# Patient Record
Sex: MALE | Race: WHITE | NOT HISPANIC OR LATINO | Employment: OTHER | URBAN - NONMETROPOLITAN AREA
[De-identification: names, ages, dates, MRNs, and addresses within clinical notes are randomized per-mention and may not be internally consistent; named-entity substitution may affect disease eponyms.]

---

## 2018-07-03 ENCOUNTER — APPOINTMENT (OUTPATIENT)
Dept: CT IMAGING | Facility: HOSPITAL | Age: 58
End: 2018-07-03

## 2018-07-03 ENCOUNTER — HOSPITAL ENCOUNTER (EMERGENCY)
Facility: HOSPITAL | Age: 58
Discharge: ANOTHER HOSPITAL | End: 2018-07-03
Attending: EMERGENCY MEDICINE | Admitting: EMERGENCY MEDICINE

## 2018-07-03 VITALS
HEIGHT: 74 IN | BODY MASS INDEX: 19.25 KG/M2 | SYSTOLIC BLOOD PRESSURE: 104 MMHG | TEMPERATURE: 97.6 F | OXYGEN SATURATION: 97 % | RESPIRATION RATE: 20 BRPM | HEART RATE: 69 BPM | WEIGHT: 150 LBS | DIASTOLIC BLOOD PRESSURE: 57 MMHG

## 2018-07-03 DIAGNOSIS — E87.1 HYPONATREMIA: ICD-10-CM

## 2018-07-03 DIAGNOSIS — R79.89 ELEVATED SERUM CREATININE: ICD-10-CM

## 2018-07-03 DIAGNOSIS — K43.6 INCARCERATED VENTRAL HERNIA: Primary | ICD-10-CM

## 2018-07-03 DIAGNOSIS — K42.0 INCARCERATED UMBILICAL HERNIA: ICD-10-CM

## 2018-07-03 LAB
ALBUMIN SERPL-MCNC: 3.8 G/DL (ref 3.5–5)
ALBUMIN/GLOB SERPL: 1.3 G/DL (ref 1.5–2.5)
ALP SERPL-CCNC: 44 U/L (ref 40–129)
ALT SERPL W P-5'-P-CCNC: 24 U/L (ref 10–44)
ANION GAP SERPL CALCULATED.3IONS-SCNC: 9.2 MMOL/L (ref 3.6–11.2)
AST SERPL-CCNC: 23 U/L (ref 10–34)
BASOPHILS # BLD AUTO: 0.03 10*3/MM3 (ref 0–0.3)
BASOPHILS NFR BLD AUTO: 0.4 % (ref 0–2)
BILIRUB SERPL-MCNC: 0.4 MG/DL (ref 0.2–1.8)
BILIRUB UR QL STRIP: NEGATIVE
BUN BLD-MCNC: 25 MG/DL (ref 7–21)
BUN/CREAT SERPL: 13.4 (ref 7–25)
CALCIUM SPEC-SCNC: 8.9 MG/DL (ref 7.7–10)
CHLORIDE SERPL-SCNC: 93 MMOL/L (ref 99–112)
CLARITY UR: CLEAR
CO2 SERPL-SCNC: 24.8 MMOL/L (ref 24.3–31.9)
COLOR UR: YELLOW
CREAT BLD-MCNC: 1.87 MG/DL (ref 0.43–1.29)
DEPRECATED RDW RBC AUTO: 40.6 FL (ref 37–54)
EOSINOPHIL # BLD AUTO: 0.27 10*3/MM3 (ref 0–0.7)
EOSINOPHIL NFR BLD AUTO: 3.6 % (ref 0–5)
ERYTHROCYTE [DISTWIDTH] IN BLOOD BY AUTOMATED COUNT: 13.4 % (ref 11.5–14.5)
GFR SERPL CREATININE-BSD FRML MDRD: 37 ML/MIN/1.73
GLOBULIN UR ELPH-MCNC: 3 GM/DL
GLUCOSE BLD-MCNC: 231 MG/DL (ref 70–110)
GLUCOSE UR STRIP-MCNC: ABNORMAL MG/DL
HCT VFR BLD AUTO: 36.6 % (ref 42–52)
HGB BLD-MCNC: 12.8 G/DL (ref 14–18)
HGB UR QL STRIP.AUTO: NEGATIVE
IMM GRANULOCYTES # BLD: 0.02 10*3/MM3 (ref 0–0.03)
IMM GRANULOCYTES NFR BLD: 0.3 % (ref 0–0.5)
KETONES UR QL STRIP: NEGATIVE
LEUKOCYTE ESTERASE UR QL STRIP.AUTO: NEGATIVE
LIPASE SERPL-CCNC: 40 U/L (ref 13–60)
LYMPHOCYTES # BLD AUTO: 1.94 10*3/MM3 (ref 1–3)
LYMPHOCYTES NFR BLD AUTO: 25.6 % (ref 21–51)
MCH RBC QN AUTO: 29.2 PG (ref 27–33)
MCHC RBC AUTO-ENTMCNC: 35 G/DL (ref 33–37)
MCV RBC AUTO: 83.6 FL (ref 80–94)
MONOCYTES # BLD AUTO: 0.83 10*3/MM3 (ref 0.1–0.9)
MONOCYTES NFR BLD AUTO: 11 % (ref 0–10)
NEUTROPHILS # BLD AUTO: 4.48 10*3/MM3 (ref 1.4–6.5)
NEUTROPHILS NFR BLD AUTO: 59.1 % (ref 30–70)
NITRITE UR QL STRIP: NEGATIVE
OSMOLALITY SERPL CALC.SUM OF ELEC: 267 MOSM/KG (ref 273–305)
PH UR STRIP.AUTO: <=5 [PH] (ref 5–8)
PLATELET # BLD AUTO: 276 10*3/MM3 (ref 130–400)
PMV BLD AUTO: 9.7 FL (ref 6–10)
POTASSIUM BLD-SCNC: 3.8 MMOL/L (ref 3.5–5.3)
PROT SERPL-MCNC: 6.8 G/DL (ref 6–8)
PROT UR QL STRIP: NEGATIVE
RBC # BLD AUTO: 4.38 10*6/MM3 (ref 4.7–6.1)
SODIUM BLD-SCNC: 127 MMOL/L (ref 135–153)
SP GR UR STRIP: 1.02 (ref 1–1.03)
UROBILINOGEN UR QL STRIP: ABNORMAL
WBC NRBC COR # BLD: 7.57 10*3/MM3 (ref 4.5–12.5)

## 2018-07-03 PROCEDURE — 85025 COMPLETE CBC W/AUTO DIFF WBC: CPT | Performed by: PHYSICIAN ASSISTANT

## 2018-07-03 PROCEDURE — 25010000002 ONDANSETRON PER 1 MG: Performed by: EMERGENCY MEDICINE

## 2018-07-03 PROCEDURE — 81003 URINALYSIS AUTO W/O SCOPE: CPT | Performed by: PHYSICIAN ASSISTANT

## 2018-07-03 PROCEDURE — 83690 ASSAY OF LIPASE: CPT | Performed by: PHYSICIAN ASSISTANT

## 2018-07-03 PROCEDURE — 99284 EMERGENCY DEPT VISIT MOD MDM: CPT

## 2018-07-03 PROCEDURE — 96375 TX/PRO/DX INJ NEW DRUG ADDON: CPT

## 2018-07-03 PROCEDURE — 74176 CT ABD & PELVIS W/O CONTRAST: CPT | Performed by: RADIOLOGY

## 2018-07-03 PROCEDURE — 96374 THER/PROPH/DIAG INJ IV PUSH: CPT

## 2018-07-03 PROCEDURE — 80053 COMPREHEN METABOLIC PANEL: CPT | Performed by: PHYSICIAN ASSISTANT

## 2018-07-03 PROCEDURE — 74176 CT ABD & PELVIS W/O CONTRAST: CPT

## 2018-07-03 PROCEDURE — 25010000002 MORPHINE PER 10 MG: Performed by: EMERGENCY MEDICINE

## 2018-07-03 PROCEDURE — 36415 COLL VENOUS BLD VENIPUNCTURE: CPT

## 2018-07-03 RX ORDER — SODIUM CHLORIDE 0.9 % (FLUSH) 0.9 %
10 SYRINGE (ML) INJECTION AS NEEDED
Status: DISCONTINUED | OUTPATIENT
Start: 2018-07-03 | End: 2018-07-03 | Stop reason: HOSPADM

## 2018-07-03 RX ORDER — ONDANSETRON 2 MG/ML
4 INJECTION INTRAMUSCULAR; INTRAVENOUS ONCE
Status: COMPLETED | OUTPATIENT
Start: 2018-07-03 | End: 2018-07-03

## 2018-07-03 RX ADMIN — ONDANSETRON 4 MG: 2 INJECTION, SOLUTION INTRAMUSCULAR; INTRAVENOUS at 16:22

## 2018-07-03 RX ADMIN — MORPHINE SULFATE 4 MG: 4 INJECTION INTRAVENOUS at 16:24

## 2018-07-03 NOTE — ED PROVIDER NOTES
Subjective   Pt states he has history of hernia, states over the past couple days has had increased pain with standing and walking, thinks his hernia has gotten bigger.         History provided by:  Patient   used: No    Abdominal Pain   Pain location:  Periumbilical  Pain quality: cramping and throbbing    Pain radiates to:  Does not radiate  Pain severity:  Moderate  Onset quality:  Gradual  Duration:  2 days  Timing:  Constant  Progression:  Worsening  Chronicity:  New  Context: not alcohol use, not awakening from sleep, not diet changes, not eating, not laxative use, not medication withdrawal, not previous surgeries, not recent illness, not recent sexual activity, not recent travel, not retching, not sick contacts, not suspicious food intake and not trauma    Relieved by:  Nothing  Worsened by:  Nothing  Ineffective treatments:  None tried  Associated symptoms: nausea    Associated symptoms: no anorexia, no belching, no chest pain, no chills, no constipation, no cough, no diarrhea, no dysuria, no fatigue, no fever, no flatus, no hematemesis, no hematochezia, no hematuria, no melena, no shortness of breath, no sore throat, no vaginal bleeding, no vaginal discharge and no vomiting    Risk factors: obesity    Risk factors: no alcohol abuse, no aspirin use, not elderly, has not had multiple surgeries, no NSAID use, not pregnant and no recent hospitalization        Review of Systems   Constitutional: Negative for chills, fatigue and fever.   HENT: Negative for sore throat.    Respiratory: Negative for cough and shortness of breath.    Cardiovascular: Negative for chest pain.   Gastrointestinal: Positive for abdominal pain and nausea. Negative for anorexia, constipation, diarrhea, flatus, hematemesis, hematochezia, melena and vomiting.   Genitourinary: Negative for dysuria, hematuria, vaginal bleeding and vaginal discharge.   All other systems reviewed and are negative.      History reviewed. No  pertinent past medical history.    No Known Allergies    History reviewed. No pertinent surgical history.    No family history on file.    Social History     Social History   • Marital status: Single     Social History Main Topics   • Drug use: Unknown     Other Topics Concern   • Not on file           Objective   Physical Exam   Constitutional: He is oriented to person, place, and time. Vital signs are normal. He appears well-developed and well-nourished.   HENT:   Head: Normocephalic and atraumatic.   Right Ear: External ear normal.   Left Ear: External ear normal.   Nose: Nose normal.   Mouth/Throat: Oropharynx is clear and moist.   Eyes: Conjunctivae and EOM are normal. Pupils are equal, round, and reactive to light.   Neck: Normal range of motion. Neck supple.   Cardiovascular: Normal rate, regular rhythm and normal heart sounds.    Pulmonary/Chest: Effort normal and breath sounds normal.   Abdominal: Soft. Normal appearance. Bowel sounds are increased. There is tenderness in the periumbilical area.   Musculoskeletal: Normal range of motion.   Neurological: He is alert and oriented to person, place, and time. GCS eye subscore is 4. GCS verbal subscore is 5. GCS motor subscore is 6.   Skin: Skin is warm and dry. Capillary refill takes less than 2 seconds.   Nursing note and vitals reviewed.      Procedures           ED Course  ED Course as of Jul 03 1824   Tue Jul 03, 2018   1600 Called and Left message with Dr Sanchez.   [MC]   1631 Dr Sanchez will eval pt in ER,   [MC]   1717 Dr Sanchez at beside to eval pt.   [MC]   1727 Dr Sanchez wanted to admit pt tonight and do operation tomorrow, family request pt be transferred to [facility].   [MC]   1729 [facility] Patient Placement Contacted.  Will page Surgery and return my call.   [MC]   1806 I called [facility] once again, pt care placement stated that ER has 7 trauma patients that have to be seen before they can call me back.   [MC]   1802 I explained to pt what we are waiting on and asked if  they would like for me to arrange transport to another facility.  Family states no they prefer to go to [facility] but do not want to sit here and wait.  Wife of the pt states they will leave here and go straight to ER at [facility].  I advised they would have to leave AMA because I have not established a transfer nor an accepting doctor.  Family states they do not care, that sitting here is waiting is just prolonging treatment.    []   1822 While preparing paperwork to let pt leave AMA, [facility] returned call, spoke with Dr Thompson who accepts pt but request I relay message that whether or not pt has surgery is based on her findings and exam.  I relayed this message to pt wife who still request to be transferred.  Pt and family request to go POV decline EMS transportation.   [MC]      ED Course User Index  [MC] TIFF Pina                  MDM  Number of Diagnoses or Management Options  Elevated serum creatinine: new and requires workup  Hyponatremia: new and requires workup  Incarcerated umbilical hernia: new and requires workup  Incarcerated ventral hernia: new and requires workup     Amount and/or Complexity of Data Reviewed  Clinical lab tests: reviewed  Tests in the radiology section of CPT®: reviewed  Discuss the patient with other providers: yes ([facility] )    Risk of Complications, Morbidity, and/or Mortality  Presenting problems: moderate  Diagnostic procedures: moderate  Management options: moderate    Patient Progress  Patient progress: stable        Final diagnoses:   Incarcerated ventral hernia   Incarcerated umbilical hernia   Elevated serum creatinine   Hyponatremia            TIFF Pina  07/03/18 7523

## 2019-06-22 ENCOUNTER — APPOINTMENT (OUTPATIENT)
Dept: CT IMAGING | Facility: HOSPITAL | Age: 59
End: 2019-06-22

## 2019-06-22 ENCOUNTER — APPOINTMENT (OUTPATIENT)
Dept: GENERAL RADIOLOGY | Facility: HOSPITAL | Age: 59
End: 2019-06-22

## 2019-06-22 ENCOUNTER — HOSPITAL ENCOUNTER (EMERGENCY)
Facility: HOSPITAL | Age: 59
Discharge: ANOTHER HOSPITAL | End: 2019-06-22
Attending: EMERGENCY MEDICINE | Admitting: EMERGENCY MEDICINE

## 2019-06-22 VITALS
RESPIRATION RATE: 17 BRPM | OXYGEN SATURATION: 99 % | HEART RATE: 85 BPM | HEIGHT: 75 IN | TEMPERATURE: 97.8 F | WEIGHT: 315 LBS | BODY MASS INDEX: 39.17 KG/M2 | DIASTOLIC BLOOD PRESSURE: 83 MMHG | SYSTOLIC BLOOD PRESSURE: 143 MMHG

## 2019-06-22 DIAGNOSIS — S42.015A: Primary | ICD-10-CM

## 2019-06-22 LAB
ALBUMIN SERPL-MCNC: 3.79 G/DL (ref 3.5–5.2)
ALBUMIN/GLOB SERPL: 1.1 G/DL
ALP SERPL-CCNC: 49 U/L (ref 39–117)
ALT SERPL W P-5'-P-CCNC: 36 U/L (ref 1–41)
ANION GAP SERPL CALCULATED.3IONS-SCNC: 13 MMOL/L
AST SERPL-CCNC: 45 U/L (ref 1–40)
BASOPHILS # BLD AUTO: 0.02 10*3/MM3 (ref 0–0.2)
BASOPHILS NFR BLD AUTO: 0.3 % (ref 0–1.5)
BILIRUB SERPL-MCNC: 0.3 MG/DL (ref 0.2–1.2)
BUN BLD-MCNC: 22 MG/DL (ref 8–23)
BUN/CREAT SERPL: 17.7 (ref 7–25)
CALCIUM SPEC-SCNC: 9.3 MG/DL (ref 8.6–10.5)
CHLORIDE SERPL-SCNC: 91 MMOL/L (ref 98–107)
CO2 SERPL-SCNC: 23 MMOL/L (ref 22–29)
CREAT BLD-MCNC: 1.24 MG/DL (ref 0.76–1.27)
DEPRECATED RDW RBC AUTO: 41 FL (ref 37–54)
EOSINOPHIL # BLD AUTO: 0.13 10*3/MM3 (ref 0–0.4)
EOSINOPHIL NFR BLD AUTO: 1.6 % (ref 0.3–6.2)
ERYTHROCYTE [DISTWIDTH] IN BLOOD BY AUTOMATED COUNT: 13.6 % (ref 12.3–15.4)
GFR SERPL CREATININE-BSD FRML MDRD: 59 ML/MIN/1.73
GLOBULIN UR ELPH-MCNC: 3.3 GM/DL
GLUCOSE BLD-MCNC: 268 MG/DL (ref 65–99)
HCT VFR BLD AUTO: 39.6 % (ref 37.5–51)
HGB BLD-MCNC: 14.2 G/DL (ref 13–17.7)
IMM GRANULOCYTES # BLD AUTO: 0.02 10*3/MM3 (ref 0–0.05)
IMM GRANULOCYTES NFR BLD AUTO: 0.3 % (ref 0–0.5)
LYMPHOCYTES # BLD AUTO: 1.77 10*3/MM3 (ref 0.7–3.1)
LYMPHOCYTES NFR BLD AUTO: 22.3 % (ref 19.6–45.3)
MCH RBC QN AUTO: 29.4 PG (ref 26.6–33)
MCHC RBC AUTO-ENTMCNC: 35.9 G/DL (ref 31.5–35.7)
MCV RBC AUTO: 82 FL (ref 79–97)
MONOCYTES # BLD AUTO: 0.95 10*3/MM3 (ref 0.1–0.9)
MONOCYTES NFR BLD AUTO: 12 % (ref 5–12)
NEUTROPHILS # BLD AUTO: 5.05 10*3/MM3 (ref 1.7–7)
NEUTROPHILS NFR BLD AUTO: 63.5 % (ref 42.7–76)
PLATELET # BLD AUTO: 292 10*3/MM3 (ref 140–450)
PMV BLD AUTO: 10.2 FL (ref 6–12)
POTASSIUM BLD-SCNC: 4.1 MMOL/L (ref 3.5–5.2)
PROT SERPL-MCNC: 7.1 G/DL (ref 6–8.5)
RBC # BLD AUTO: 4.83 10*6/MM3 (ref 4.14–5.8)
SODIUM BLD-SCNC: 127 MMOL/L (ref 136–145)
TROPONIN T SERPL-MCNC: <0.01 NG/ML (ref 0–0.03)
WBC NRBC COR # BLD: 7.94 10*3/MM3 (ref 3.4–10.8)

## 2019-06-22 PROCEDURE — 71250 CT THORAX DX C-: CPT

## 2019-06-22 PROCEDURE — 99284 EMERGENCY DEPT VISIT MOD MDM: CPT

## 2019-06-22 PROCEDURE — 96374 THER/PROPH/DIAG INJ IV PUSH: CPT

## 2019-06-22 PROCEDURE — 70450 CT HEAD/BRAIN W/O DYE: CPT

## 2019-06-22 PROCEDURE — 25010000002 MORPHINE PER 10 MG: Performed by: EMERGENCY MEDICINE

## 2019-06-22 PROCEDURE — 70450 CT HEAD/BRAIN W/O DYE: CPT | Performed by: RADIOLOGY

## 2019-06-22 PROCEDURE — 73030 X-RAY EXAM OF SHOULDER: CPT | Performed by: RADIOLOGY

## 2019-06-22 PROCEDURE — 25010000002 ONDANSETRON PER 1 MG: Performed by: EMERGENCY MEDICINE

## 2019-06-22 PROCEDURE — 71270 CT THORAX DX C-/C+: CPT

## 2019-06-22 PROCEDURE — 71045 X-RAY EXAM CHEST 1 VIEW: CPT

## 2019-06-22 PROCEDURE — 71045 X-RAY EXAM CHEST 1 VIEW: CPT | Performed by: RADIOLOGY

## 2019-06-22 PROCEDURE — 0 IOVERSOL 68 % SOLUTION: Performed by: EMERGENCY MEDICINE

## 2019-06-22 PROCEDURE — 93005 ELECTROCARDIOGRAM TRACING: CPT | Performed by: EMERGENCY MEDICINE

## 2019-06-22 PROCEDURE — 71260 CT THORAX DX C+: CPT

## 2019-06-22 PROCEDURE — 96375 TX/PRO/DX INJ NEW DRUG ADDON: CPT

## 2019-06-22 PROCEDURE — 73030 X-RAY EXAM OF SHOULDER: CPT

## 2019-06-22 PROCEDURE — 71250 CT THORAX DX C-: CPT | Performed by: RADIOLOGY

## 2019-06-22 PROCEDURE — 80053 COMPREHEN METABOLIC PANEL: CPT | Performed by: EMERGENCY MEDICINE

## 2019-06-22 PROCEDURE — 85025 COMPLETE CBC W/AUTO DIFF WBC: CPT | Performed by: EMERGENCY MEDICINE

## 2019-06-22 PROCEDURE — 84484 ASSAY OF TROPONIN QUANT: CPT | Performed by: EMERGENCY MEDICINE

## 2019-06-22 PROCEDURE — 71260 CT THORAX DX C+: CPT | Performed by: RADIOLOGY

## 2019-06-22 RX ORDER — ONDANSETRON 2 MG/ML
4 INJECTION INTRAMUSCULAR; INTRAVENOUS ONCE
Status: COMPLETED | OUTPATIENT
Start: 2019-06-22 | End: 2019-06-22

## 2019-06-22 RX ORDER — SODIUM CHLORIDE 0.9 % (FLUSH) 0.9 %
10 SYRINGE (ML) INJECTION AS NEEDED
Status: DISCONTINUED | OUTPATIENT
Start: 2019-06-22 | End: 2019-06-22 | Stop reason: HOSPADM

## 2019-06-22 RX ADMIN — ONDANSETRON 4 MG: 2 INJECTION, SOLUTION INTRAMUSCULAR; INTRAVENOUS at 12:21

## 2019-06-22 RX ADMIN — MORPHINE SULFATE 4 MG: 4 INJECTION, SOLUTION INTRAMUSCULAR; INTRAVENOUS at 12:21

## 2019-06-22 RX ADMIN — IOVERSOL 100 ML: 678 INJECTION INTRA-ARTERIAL; INTRAVENOUS at 17:57

## 2019-06-22 RX ADMIN — SODIUM CHLORIDE 1000 ML: 9 INJECTION, SOLUTION INTRAVENOUS at 16:37

## 2019-06-26 ENCOUNTER — HOSPITAL ENCOUNTER (EMERGENCY)
Facility: HOSPITAL | Age: 59
Discharge: HOME | End: 2019-06-26
Attending: EMERGENCY MEDICINE | Admitting: EMERGENCY MEDICINE

## 2019-06-26 ENCOUNTER — APPOINTMENT (OUTPATIENT)
Dept: GENERAL RADIOLOGY | Facility: HOSPITAL | Age: 59
End: 2019-06-26

## 2019-06-26 VITALS
BODY MASS INDEX: 39.17 KG/M2 | RESPIRATION RATE: 18 BRPM | TEMPERATURE: 98.1 F | DIASTOLIC BLOOD PRESSURE: 85 MMHG | WEIGHT: 315 LBS | SYSTOLIC BLOOD PRESSURE: 162 MMHG | HEIGHT: 75 IN | HEART RATE: 84 BPM | OXYGEN SATURATION: 97 %

## 2019-06-26 DIAGNOSIS — S49.92XD INJURY OF LEFT CLAVICLE, SUBSEQUENT ENCOUNTER: Primary | ICD-10-CM

## 2019-06-26 PROCEDURE — 73000 X-RAY EXAM OF COLLAR BONE: CPT

## 2019-06-26 PROCEDURE — 99284 EMERGENCY DEPT VISIT MOD MDM: CPT

## 2019-06-26 RX ORDER — AMITRIPTYLINE HYDROCHLORIDE 100 MG/1
100 TABLET, FILM COATED ORAL NIGHTLY
COMMUNITY

## 2019-06-26 RX ORDER — LISINOPRIL 40 MG/1
10 TABLET ORAL EVERY EVENING
COMMUNITY

## 2019-06-26 RX ORDER — ASPIRIN 81 MG/1
81 TABLET ORAL EVERY EVENING
COMMUNITY

## 2019-06-26 RX ORDER — TRAMADOL HYDROCHLORIDE 50 MG/1
50 TABLET ORAL EVERY 6 HOURS PRN
COMMUNITY

## 2019-06-26 RX ORDER — OXYCODONE AND ACETAMINOPHEN 10; 325 MG/1; MG/1
1 TABLET ORAL EVERY 6 HOURS PRN
Status: ON HOLD | COMMUNITY
End: 2019-07-04 | Stop reason: SDUPTHER

## 2019-06-26 RX ORDER — FENOFIBRATE 145 MG/1
145 TABLET, COATED ORAL EVERY EVENING
COMMUNITY

## 2019-06-26 RX ORDER — LEVOTHYROXINE SODIUM 0.03 MG/1
25 TABLET ORAL EVERY EVENING
COMMUNITY

## 2019-06-26 RX ORDER — IBUPROFEN 400 MG/1
400 TABLET ORAL EVERY 6 HOURS PRN
COMMUNITY
End: 2020-03-27

## 2019-07-02 ENCOUNTER — HOSPITAL ENCOUNTER (OUTPATIENT)
Dept: GENERAL RADIOLOGY | Facility: HOSPITAL | Age: 59
Discharge: HOME | End: 2019-07-02
Admitting: ORTHOPAEDIC SURGERY

## 2019-07-02 ENCOUNTER — APPOINTMENT (OUTPATIENT)
Dept: PREADMISSION TESTING | Facility: HOSPITAL | Age: 59
End: 2019-07-02

## 2019-07-02 VITALS — BODY MASS INDEX: 39.17 KG/M2 | WEIGHT: 315 LBS | HEIGHT: 75 IN

## 2019-07-02 LAB
HBA1C MFR BLD: 8.5 % (ref 4.8–5.6)
POTASSIUM BLD-SCNC: 4.4 MMOL/L (ref 3.5–5.2)

## 2019-07-02 PROCEDURE — 86901 BLOOD TYPING SEROLOGIC RH(D): CPT

## 2019-07-02 PROCEDURE — 36415 COLL VENOUS BLD VENIPUNCTURE: CPT

## 2019-07-02 PROCEDURE — 84132 ASSAY OF SERUM POTASSIUM: CPT | Performed by: ANESTHESIOLOGY

## 2019-07-02 PROCEDURE — 71046 X-RAY EXAM CHEST 2 VIEWS: CPT

## 2019-07-02 PROCEDURE — 86900 BLOOD TYPING SEROLOGIC ABO: CPT

## 2019-07-02 PROCEDURE — 83036 HEMOGLOBIN GLYCOSYLATED A1C: CPT | Performed by: ORTHOPAEDIC SURGERY

## 2019-07-02 NOTE — PAT
CBC and BMP 6/22/19 in ER as well as EKG         Patient instructed to drink 20 ounces (or until full) of Gatorade or 20 ounces of G2 (if diabetic) and complete 1 hour before arrival time for procedure (NO RED Gatorade or G2)    Patient verbalized understanding.

## 2019-07-03 ENCOUNTER — HOSPITAL ENCOUNTER (OUTPATIENT)
Facility: HOSPITAL | Age: 59
Discharge: HOME | End: 2019-07-04
Attending: ORTHOPAEDIC SURGERY | Admitting: ORTHOPAEDIC SURGERY

## 2019-07-03 ENCOUNTER — APPOINTMENT (OUTPATIENT)
Dept: GENERAL RADIOLOGY | Facility: HOSPITAL | Age: 59
End: 2019-07-03

## 2019-07-03 ENCOUNTER — ANESTHESIA (OUTPATIENT)
Dept: PERIOP | Facility: HOSPITAL | Age: 59
End: 2019-07-03

## 2019-07-03 ENCOUNTER — ANESTHESIA EVENT (OUTPATIENT)
Dept: PERIOP | Facility: HOSPITAL | Age: 59
End: 2019-07-03

## 2019-07-03 DIAGNOSIS — E11.9 TYPE 2 DIABETES MELLITUS WITHOUT COMPLICATION, WITHOUT LONG-TERM CURRENT USE OF INSULIN (HCC): Primary | ICD-10-CM

## 2019-07-03 DIAGNOSIS — Z74.09 IMPAIRED MOBILITY AND ADLS: ICD-10-CM

## 2019-07-03 DIAGNOSIS — Z78.9 IMPAIRED MOBILITY AND ADLS: ICD-10-CM

## 2019-07-03 PROBLEM — I10 ESSENTIAL HYPERTENSION: Status: ACTIVE | Noted: 2019-07-03

## 2019-07-03 PROBLEM — Z99.89 OSA ON CPAP: Status: ACTIVE | Noted: 2019-07-03

## 2019-07-03 PROBLEM — G47.33 OSA ON CPAP: Status: ACTIVE | Noted: 2019-07-03

## 2019-07-03 PROBLEM — E03.9 HYPOTHYROIDISM (ACQUIRED): Status: ACTIVE | Noted: 2019-07-03

## 2019-07-03 PROBLEM — S43.225A: Status: ACTIVE | Noted: 2019-07-03

## 2019-07-03 PROBLEM — E78.5 HLD (HYPERLIPIDEMIA): Status: ACTIVE | Noted: 2019-07-03

## 2019-07-03 LAB
ABO GROUP BLD: NORMAL
ABO GROUP BLD: NORMAL
BLD GP AB SCN SERPL QL: NEGATIVE
GLUCOSE BLDC GLUCOMTR-MCNC: 133 MG/DL (ref 70–130)
GLUCOSE BLDC GLUCOMTR-MCNC: 284 MG/DL (ref 70–130)
GLUCOSE BLDC GLUCOMTR-MCNC: 295 MG/DL (ref 70–130)
RH BLD: POSITIVE
RH BLD: POSITIVE
SODIUM BLD-SCNC: 130 MMOL/L (ref 136–145)
T&S EXPIRATION DATE: NORMAL

## 2019-07-03 PROCEDURE — 25010000002 DEXAMETHASONE SODIUM PHOSPHATE 10 MG/ML SOLUTION: Performed by: NURSE ANESTHETIST, CERTIFIED REGISTERED

## 2019-07-03 PROCEDURE — C1713 ANCHOR/SCREW BN/BN,TIS/BN: HCPCS | Performed by: ORTHOPAEDIC SURGERY

## 2019-07-03 PROCEDURE — 25010000002 NEOSTIGMINE 10 MG/10ML SOLUTION: Performed by: NURSE ANESTHETIST, CERTIFIED REGISTERED

## 2019-07-03 PROCEDURE — 25010000002 FENTANYL CITRATE (PF) 100 MCG/2ML SOLUTION: Performed by: NURSE ANESTHETIST, CERTIFIED REGISTERED

## 2019-07-03 PROCEDURE — 25010000002 DEXAMETHASONE PER 1 MG: Performed by: NURSE ANESTHETIST, CERTIFIED REGISTERED

## 2019-07-03 PROCEDURE — 63710000001 INSULIN LISPRO (HUMAN) PER 5 UNITS: Performed by: NURSE PRACTITIONER

## 2019-07-03 PROCEDURE — 71045 X-RAY EXAM CHEST 1 VIEW: CPT

## 2019-07-03 PROCEDURE — 25010000002 VANCOMYCIN: Performed by: ORTHOPAEDIC SURGERY

## 2019-07-03 PROCEDURE — 86901 BLOOD TYPING SEROLOGIC RH(D): CPT | Performed by: ANESTHESIOLOGY

## 2019-07-03 PROCEDURE — 99243 OFF/OP CNSLTJ NEW/EST LOW 30: CPT | Performed by: NURSE PRACTITIONER

## 2019-07-03 PROCEDURE — 25010000002 CEFAZOLIN PER 500 MG: Performed by: ORTHOPAEDIC SURGERY

## 2019-07-03 PROCEDURE — 86850 RBC ANTIBODY SCREEN: CPT | Performed by: ANESTHESIOLOGY

## 2019-07-03 PROCEDURE — 25010000002 ONDANSETRON PER 1 MG: Performed by: NURSE ANESTHETIST, CERTIFIED REGISTERED

## 2019-07-03 PROCEDURE — 82962 GLUCOSE BLOOD TEST: CPT

## 2019-07-03 PROCEDURE — L3670 SO ACRO/CLAV CAN WEB PRE OTS: HCPCS | Performed by: ORTHOPAEDIC SURGERY

## 2019-07-03 PROCEDURE — 99245 OFF/OP CONSLTJ NEW/EST HI 55: CPT | Performed by: THORACIC SURGERY (CARDIOTHORACIC VASCULAR SURGERY)

## 2019-07-03 PROCEDURE — 86920 COMPATIBILITY TEST SPIN: CPT

## 2019-07-03 PROCEDURE — 23530 OPTX STRNCLAV DISLC AQT/CHRN: CPT | Performed by: THORACIC SURGERY (CARDIOTHORACIC VASCULAR SURGERY)

## 2019-07-03 PROCEDURE — 25010000002 HYDROMORPHONE 1 MG/ML SOLUTION: Performed by: ORTHOPAEDIC SURGERY

## 2019-07-03 PROCEDURE — 25010000002 PROPOFOL 10 MG/ML EMULSION: Performed by: NURSE ANESTHETIST, CERTIFIED REGISTERED

## 2019-07-03 PROCEDURE — 25010000002 BUPRENORPHINE PER 0.1 MG: Performed by: NURSE ANESTHETIST, CERTIFIED REGISTERED

## 2019-07-03 PROCEDURE — 84295 ASSAY OF SERUM SODIUM: CPT | Performed by: ANESTHESIOLOGY

## 2019-07-03 PROCEDURE — 86900 BLOOD TYPING SEROLOGIC ABO: CPT | Performed by: ANESTHESIOLOGY

## 2019-07-03 DEVICE — SUT/ANCH GRYPHON/P BR W/DYNACORD: Type: IMPLANTABLE DEVICE | Site: ACROMIAL PROCESS | Status: FUNCTIONAL

## 2019-07-03 DEVICE — CLIP LIG HEMOLOK PA LG 6CT PRP: Type: IMPLANTABLE DEVICE | Status: FUNCTIONAL

## 2019-07-03 RX ORDER — LISINOPRIL 40 MG/1
40 TABLET ORAL EVERY EVENING
Status: DISCONTINUED | OUTPATIENT
Start: 2019-07-04 | End: 2019-07-03

## 2019-07-03 RX ORDER — PROMETHAZINE HYDROCHLORIDE 25 MG/1
25 TABLET ORAL ONCE AS NEEDED
Status: DISCONTINUED | OUTPATIENT
Start: 2019-07-03 | End: 2019-07-03 | Stop reason: HOSPADM

## 2019-07-03 RX ORDER — ONDANSETRON 2 MG/ML
4 INJECTION INTRAMUSCULAR; INTRAVENOUS EVERY 6 HOURS PRN
Status: DISCONTINUED | OUTPATIENT
Start: 2019-07-03 | End: 2019-07-04 | Stop reason: HOSPADM

## 2019-07-03 RX ORDER — BISACODYL 5 MG/1
10 TABLET, DELAYED RELEASE ORAL DAILY PRN
Status: DISCONTINUED | OUTPATIENT
Start: 2019-07-03 | End: 2019-07-04 | Stop reason: HOSPADM

## 2019-07-03 RX ORDER — AMITRIPTYLINE HYDROCHLORIDE 50 MG/1
100 TABLET, FILM COATED ORAL NIGHTLY
Status: DISCONTINUED | OUTPATIENT
Start: 2019-07-04 | End: 2019-07-03

## 2019-07-03 RX ORDER — HYDROCODONE BITARTRATE AND ACETAMINOPHEN 10; 325 MG/1; MG/1
1 TABLET ORAL EVERY 4 HOURS PRN
Status: DISCONTINUED | OUTPATIENT
Start: 2019-07-03 | End: 2019-07-04 | Stop reason: HOSPADM

## 2019-07-03 RX ORDER — PROMETHAZINE HYDROCHLORIDE 25 MG/ML
6.25 INJECTION, SOLUTION INTRAMUSCULAR; INTRAVENOUS ONCE AS NEEDED
Status: DISCONTINUED | OUTPATIENT
Start: 2019-07-03 | End: 2019-07-03 | Stop reason: HOSPADM

## 2019-07-03 RX ORDER — MAGNESIUM HYDROXIDE 1200 MG/15ML
LIQUID ORAL AS NEEDED
Status: DISCONTINUED | OUTPATIENT
Start: 2019-07-03 | End: 2019-07-03 | Stop reason: HOSPADM

## 2019-07-03 RX ORDER — FAMOTIDINE 20 MG/1
20 TABLET, FILM COATED ORAL
Status: DISCONTINUED | OUTPATIENT
Start: 2019-07-03 | End: 2019-07-03 | Stop reason: HOSPADM

## 2019-07-03 RX ORDER — DEXTROSE MONOHYDRATE 25 G/50ML
25 INJECTION, SOLUTION INTRAVENOUS
Status: DISCONTINUED | OUTPATIENT
Start: 2019-07-03 | End: 2019-07-04 | Stop reason: HOSPADM

## 2019-07-03 RX ORDER — SODIUM CHLORIDE, SODIUM LACTATE, POTASSIUM CHLORIDE, CALCIUM CHLORIDE 600; 310; 30; 20 MG/100ML; MG/100ML; MG/100ML; MG/100ML
9 INJECTION, SOLUTION INTRAVENOUS CONTINUOUS PRN
Status: DISCONTINUED | OUTPATIENT
Start: 2019-07-03 | End: 2019-07-03 | Stop reason: HOSPADM

## 2019-07-03 RX ORDER — BUPRENORPHINE HYDROCHLORIDE 0.32 MG/ML
INJECTION INTRAMUSCULAR; INTRAVENOUS
Status: COMPLETED | OUTPATIENT
Start: 2019-07-03 | End: 2019-07-03

## 2019-07-03 RX ORDER — LIDOCAINE HYDROCHLORIDE 10 MG/ML
INJECTION, SOLUTION EPIDURAL; INFILTRATION; INTRACAUDAL; PERINEURAL AS NEEDED
Status: DISCONTINUED | OUTPATIENT
Start: 2019-07-03 | End: 2019-07-03 | Stop reason: SURG

## 2019-07-03 RX ORDER — DEXAMETHASONE SODIUM PHOSPHATE 10 MG/ML
INJECTION, SOLUTION INTRAMUSCULAR; INTRAVENOUS
Status: COMPLETED | OUTPATIENT
Start: 2019-07-03 | End: 2019-07-03

## 2019-07-03 RX ORDER — NALOXONE HCL 0.4 MG/ML
0.1 VIAL (ML) INJECTION
Status: DISCONTINUED | OUTPATIENT
Start: 2019-07-03 | End: 2019-07-04 | Stop reason: HOSPADM

## 2019-07-03 RX ORDER — NEOSTIGMINE METHYLSULFATE 1 MG/ML
INJECTION, SOLUTION INTRAVENOUS AS NEEDED
Status: DISCONTINUED | OUTPATIENT
Start: 2019-07-03 | End: 2019-07-03 | Stop reason: SURG

## 2019-07-03 RX ORDER — FENTANYL CITRATE 50 UG/ML
50 INJECTION, SOLUTION INTRAMUSCULAR; INTRAVENOUS
Status: DISCONTINUED | OUTPATIENT
Start: 2019-07-03 | End: 2019-07-03 | Stop reason: HOSPADM

## 2019-07-03 RX ORDER — ROCURONIUM BROMIDE 10 MG/ML
INJECTION, SOLUTION INTRAVENOUS AS NEEDED
Status: DISCONTINUED | OUTPATIENT
Start: 2019-07-03 | End: 2019-07-03 | Stop reason: SURG

## 2019-07-03 RX ORDER — ONDANSETRON 2 MG/ML
4 INJECTION INTRAMUSCULAR; INTRAVENOUS ONCE AS NEEDED
Status: DISCONTINUED | OUTPATIENT
Start: 2019-07-03 | End: 2019-07-03 | Stop reason: HOSPADM

## 2019-07-03 RX ORDER — FENOFIBRATE 145 MG/1
145 TABLET, COATED ORAL DAILY
Status: DISCONTINUED | OUTPATIENT
Start: 2019-07-04 | End: 2019-07-03

## 2019-07-03 RX ORDER — FENTANYL CITRATE 50 UG/ML
INJECTION, SOLUTION INTRAMUSCULAR; INTRAVENOUS AS NEEDED
Status: DISCONTINUED | OUTPATIENT
Start: 2019-07-03 | End: 2019-07-03 | Stop reason: SURG

## 2019-07-03 RX ORDER — DOCUSATE SODIUM 100 MG/1
100 CAPSULE, LIQUID FILLED ORAL 2 TIMES DAILY PRN
Status: DISCONTINUED | OUTPATIENT
Start: 2019-07-03 | End: 2019-07-04 | Stop reason: HOSPADM

## 2019-07-03 RX ORDER — LEVOTHYROXINE SODIUM 0.03 MG/1
25 TABLET ORAL EVERY EVENING
Status: DISCONTINUED | OUTPATIENT
Start: 2019-07-04 | End: 2019-07-03

## 2019-07-03 RX ORDER — FENOFIBRATE 145 MG/1
145 TABLET, COATED ORAL NIGHTLY
Status: DISCONTINUED | OUTPATIENT
Start: 2019-07-03 | End: 2019-07-04 | Stop reason: HOSPADM

## 2019-07-03 RX ORDER — PROMETHAZINE HYDROCHLORIDE 25 MG/1
25 SUPPOSITORY RECTAL ONCE AS NEEDED
Status: DISCONTINUED | OUTPATIENT
Start: 2019-07-03 | End: 2019-07-03 | Stop reason: HOSPADM

## 2019-07-03 RX ORDER — ONDANSETRON 2 MG/ML
INJECTION INTRAMUSCULAR; INTRAVENOUS AS NEEDED
Status: DISCONTINUED | OUTPATIENT
Start: 2019-07-03 | End: 2019-07-03 | Stop reason: SURG

## 2019-07-03 RX ORDER — ACETAMINOPHEN 500 MG
1000 TABLET ORAL ONCE
Status: COMPLETED | OUTPATIENT
Start: 2019-07-03 | End: 2019-07-03

## 2019-07-03 RX ORDER — LIDOCAINE HYDROCHLORIDE 10 MG/ML
0.5 INJECTION, SOLUTION EPIDURAL; INFILTRATION; INTRACAUDAL; PERINEURAL ONCE AS NEEDED
Status: COMPLETED | OUTPATIENT
Start: 2019-07-03 | End: 2019-07-03

## 2019-07-03 RX ORDER — NICOTINE POLACRILEX 4 MG
15 LOZENGE BUCCAL
Status: DISCONTINUED | OUTPATIENT
Start: 2019-07-03 | End: 2019-07-04 | Stop reason: HOSPADM

## 2019-07-03 RX ORDER — DEXAMETHASONE SODIUM PHOSPHATE 4 MG/ML
INJECTION, SOLUTION INTRA-ARTICULAR; INTRALESIONAL; INTRAMUSCULAR; INTRAVENOUS; SOFT TISSUE AS NEEDED
Status: DISCONTINUED | OUTPATIENT
Start: 2019-07-03 | End: 2019-07-03 | Stop reason: SURG

## 2019-07-03 RX ORDER — ONDANSETRON 4 MG/1
4 TABLET, FILM COATED ORAL EVERY 6 HOURS PRN
Status: DISCONTINUED | OUTPATIENT
Start: 2019-07-03 | End: 2019-07-04 | Stop reason: HOSPADM

## 2019-07-03 RX ORDER — MEPERIDINE HYDROCHLORIDE 25 MG/ML
12.5 INJECTION INTRAMUSCULAR; INTRAVENOUS; SUBCUTANEOUS
Status: DISCONTINUED | OUTPATIENT
Start: 2019-07-03 | End: 2019-07-03 | Stop reason: HOSPADM

## 2019-07-03 RX ORDER — SODIUM CHLORIDE 0.9 % (FLUSH) 0.9 %
3 SYRINGE (ML) INJECTION EVERY 12 HOURS SCHEDULED
Status: DISCONTINUED | OUTPATIENT
Start: 2019-07-03 | End: 2019-07-03 | Stop reason: HOSPADM

## 2019-07-03 RX ORDER — SODIUM CHLORIDE, SODIUM LACTATE, POTASSIUM CHLORIDE, CALCIUM CHLORIDE 600; 310; 30; 20 MG/100ML; MG/100ML; MG/100ML; MG/100ML
INJECTION, SOLUTION INTRAVENOUS CONTINUOUS PRN
Status: DISCONTINUED | OUTPATIENT
Start: 2019-07-03 | End: 2019-07-03 | Stop reason: SURG

## 2019-07-03 RX ORDER — LISINOPRIL 40 MG/1
40 TABLET ORAL EVERY EVENING
Status: DISCONTINUED | OUTPATIENT
Start: 2019-07-03 | End: 2019-07-04 | Stop reason: HOSPADM

## 2019-07-03 RX ORDER — PROPOFOL 10 MG/ML
VIAL (ML) INTRAVENOUS AS NEEDED
Status: DISCONTINUED | OUTPATIENT
Start: 2019-07-03 | End: 2019-07-03 | Stop reason: SURG

## 2019-07-03 RX ORDER — SODIUM CHLORIDE 450 MG/100ML
50 INJECTION, SOLUTION INTRAVENOUS CONTINUOUS
Status: DISCONTINUED | OUTPATIENT
Start: 2019-07-03 | End: 2019-07-04 | Stop reason: HOSPADM

## 2019-07-03 RX ORDER — GLYCOPYRROLATE 0.2 MG/ML
INJECTION INTRAMUSCULAR; INTRAVENOUS AS NEEDED
Status: DISCONTINUED | OUTPATIENT
Start: 2019-07-03 | End: 2019-07-03 | Stop reason: SURG

## 2019-07-03 RX ORDER — SODIUM CHLORIDE 0.9 % (FLUSH) 0.9 %
1-10 SYRINGE (ML) INJECTION AS NEEDED
Status: DISCONTINUED | OUTPATIENT
Start: 2019-07-03 | End: 2019-07-03 | Stop reason: HOSPADM

## 2019-07-03 RX ORDER — BUPIVACAINE HYDROCHLORIDE 2.5 MG/ML
INJECTION, SOLUTION EPIDURAL; INFILTRATION; INTRACAUDAL
Status: COMPLETED | OUTPATIENT
Start: 2019-07-03 | End: 2019-07-03

## 2019-07-03 RX ORDER — CEFAZOLIN SODIUM IN 0.9 % NACL 3 G/100 ML
3 INTRAVENOUS SOLUTION, PIGGYBACK (ML) INTRAVENOUS ONCE
Status: COMPLETED | OUTPATIENT
Start: 2019-07-03 | End: 2019-07-03

## 2019-07-03 RX ORDER — AMITRIPTYLINE HYDROCHLORIDE 50 MG/1
100 TABLET, FILM COATED ORAL NIGHTLY
Status: DISCONTINUED | OUTPATIENT
Start: 2019-07-03 | End: 2019-07-04 | Stop reason: HOSPADM

## 2019-07-03 RX ORDER — LEVOTHYROXINE SODIUM 0.03 MG/1
25 TABLET ORAL NIGHTLY
Status: DISCONTINUED | OUTPATIENT
Start: 2019-07-03 | End: 2019-07-04 | Stop reason: HOSPADM

## 2019-07-03 RX ADMIN — DEXAMETHASONE SODIUM PHOSPHATE 4 MG: 10 INJECTION INTRAMUSCULAR; INTRAVENOUS at 12:42

## 2019-07-03 RX ADMIN — BUPIVACAINE HYDROCHLORIDE 10 ML: 2.5 INJECTION, SOLUTION EPIDURAL; INFILTRATION; INTRACAUDAL; PERINEURAL at 12:42

## 2019-07-03 RX ADMIN — PROPOFOL 250 MG: 10 INJECTION, EMULSION INTRAVENOUS at 13:18

## 2019-07-03 RX ADMIN — FAMOTIDINE 20 MG: 20 TABLET ORAL at 12:02

## 2019-07-03 RX ADMIN — LISINOPRIL 40 MG: 40 TABLET ORAL at 22:23

## 2019-07-03 RX ADMIN — FAMOTIDINE 20 MG: 20 TABLET ORAL at 12:00

## 2019-07-03 RX ADMIN — LEVOTHYROXINE SODIUM 25 MCG: 25 TABLET ORAL at 22:23

## 2019-07-03 RX ADMIN — HYDROCODONE BITARTRATE AND ACETAMINOPHEN 1 TABLET: 10; 325 TABLET ORAL at 22:23

## 2019-07-03 RX ADMIN — ROCURONIUM BROMIDE 10 MG: 10 INJECTION INTRAVENOUS at 13:18

## 2019-07-03 RX ADMIN — FENTANYL CITRATE 100 MCG: 50 INJECTION, SOLUTION INTRAMUSCULAR; INTRAVENOUS at 13:18

## 2019-07-03 RX ADMIN — ONDANSETRON 4 MG: 2 INJECTION INTRAMUSCULAR; INTRAVENOUS at 14:31

## 2019-07-03 RX ADMIN — SODIUM CHLORIDE, POTASSIUM CHLORIDE, SODIUM LACTATE AND CALCIUM CHLORIDE 9 ML/HR: 600; 310; 30; 20 INJECTION, SOLUTION INTRAVENOUS at 12:02

## 2019-07-03 RX ADMIN — INSULIN LISPRO 4 UNITS: 100 INJECTION, SOLUTION INTRAVENOUS; SUBCUTANEOUS at 23:38

## 2019-07-03 RX ADMIN — EPHEDRINE SULFATE 10 MG: 50 INJECTION INTRAMUSCULAR; INTRAVENOUS; SUBCUTANEOUS at 13:26

## 2019-07-03 RX ADMIN — SODIUM CHLORIDE, POTASSIUM CHLORIDE, SODIUM LACTATE AND CALCIUM CHLORIDE: 600; 310; 30; 20 INJECTION, SOLUTION INTRAVENOUS at 13:10

## 2019-07-03 RX ADMIN — ROCURONIUM BROMIDE 40 MG: 10 INJECTION INTRAVENOUS at 13:28

## 2019-07-03 RX ADMIN — SODIUM CHLORIDE 50 ML/HR: 4.5 INJECTION, SOLUTION INTRAVENOUS at 15:54

## 2019-07-03 RX ADMIN — LIDOCAINE HYDROCHLORIDE 0.3 ML: 10 INJECTION, SOLUTION EPIDURAL; INFILTRATION; INTRACAUDAL; PERINEURAL at 12:02

## 2019-07-03 RX ADMIN — VANCOMYCIN HYDROCHLORIDE 2250 MG: 1 INJECTION, POWDER, LYOPHILIZED, FOR SOLUTION INTRAVENOUS at 12:44

## 2019-07-03 RX ADMIN — GLYCOPYRROLATE 0.4 MG: 0.2 INJECTION, SOLUTION INTRAMUSCULAR; INTRAVENOUS at 14:43

## 2019-07-03 RX ADMIN — LIDOCAINE HYDROCHLORIDE 50 MG: 10 INJECTION, SOLUTION EPIDURAL; INFILTRATION; INTRACAUDAL; PERINEURAL at 13:18

## 2019-07-03 RX ADMIN — HYDROCODONE BITARTRATE AND ACETAMINOPHEN 1 TABLET: 10; 325 TABLET ORAL at 17:54

## 2019-07-03 RX ADMIN — DEXAMETHASONE SODIUM PHOSPHATE 8 MG: 4 INJECTION, SOLUTION INTRAMUSCULAR; INTRAVENOUS at 13:30

## 2019-07-03 RX ADMIN — AMITRIPTYLINE HYDROCHLORIDE 100 MG: 50 TABLET, FILM COATED ORAL at 22:23

## 2019-07-03 RX ADMIN — ACETAMINOPHEN 1000 MG: 500 TABLET ORAL at 12:02

## 2019-07-03 RX ADMIN — SODIUM CHLORIDE 3 G: 9 INJECTION, SOLUTION INTRAVENOUS at 12:21

## 2019-07-03 RX ADMIN — NEOSTIGMINE METHYLSULFATE 3.5 MG: 1 INJECTION, SOLUTION INTRAVENOUS at 14:43

## 2019-07-03 RX ADMIN — FENOFIBRATE 145 MG: 145 TABLET ORAL at 22:23

## 2019-07-03 RX ADMIN — BUPRENORPHINE HYDROCHLORIDE 0.3 MG: 0.32 INJECTION INTRAMUSCULAR; INTRAVENOUS at 12:42

## 2019-07-03 NOTE — INTERVAL H&P NOTE
Pre-Op H&P (See Recent Office Note Attached for Full H&P)    Chief complaint: Left shoulder pain    HPI:      Patient is a 59 y.o. male who presents with left shoulder pain that started immediately after he fell off the roof of his mobile home on 6/21/19. He went to an urgent care center in [place], [place] where he was told he had a clavicle injury. He followed up at [facility] where he was told he had a posterior sternoclavicular dislocation of his left clavicle. He then went to [facility] in [place] who referred him to Dr. Carey.for definitive treatment. CT scan shows a left posterior sternoclavicular dislocation. Surgical intervention is recommended and he is agreeable. He is here today for an open reduction left sternoclavicular dislocation.      Review of Systems:  General ROS:  no fever, chills, rashes, No change since last office visit  Cardiovascular ROS: no chest pain or dyspnea on exertion; +HTN  Respiratory ROS: no cough, shortness of breath, or wheezing: +MEGA (uses CPAP); former cigarette smoker (3 ppd)- quit 1995    Meds:    No current facility-administered medications on file prior to encounter.      Current Outpatient Medications on File Prior to Encounter   Medication Sig Dispense Refill   • amitriptyline (ELAVIL) 100 MG tablet Take 100 mg by mouth Every Night.     • aspirin 81 MG EC tablet Take 81 mg by mouth Every Evening.     • fenofibrate (TRICOR) 145 MG tablet Take 145 mg by mouth Every Evening.     • ibuprofen (ADVIL,MOTRIN) 400 MG tablet Take 400 mg by mouth Every 6 (Six) Hours As Needed for Mild Pain .     • levothyroxine (SYNTHROID, LEVOTHROID) 25 MCG tablet Take 25 mcg by mouth Every Evening.     • lisinopril (PRINIVIL,ZESTRIL) 40 MG tablet Take 40 mg by mouth Every Evening.     • oxyCODONE-acetaminophen (PERCOCET)  MG per tablet Take 1 tablet by mouth Every 6 (Six) Hours As Needed for Moderate Pain .     • traMADol (ULTRAM) 50 MG tablet Take 50 mg by mouth Every 6  "(Six) Hours As Needed for Moderate Pain .         Vital Signs:  /82 (BP Location: Right arm, Patient Position: Lying)   Pulse 95   Temp 98.4 °F (36.9 °C) (Temporal)   Resp 18   Ht 190.5 cm (75\")   Wt (!) 151 kg (332 lb 6.4 oz)   SpO2 95%   BMI 41.55 kg/m²     Physical Exam:    CV:  S1S2 regular rate, irregular rhythm, no murmur               Resp:  Clear to auscultation; diminished lung sounds bilaterally; respirations  regular, even and unlabored    Results Review:     Lab Results   Component Value Date    WBC 7.94 06/22/2019    HGB 14.2 06/22/2019    HCT 39.6 06/22/2019    MCV 82.0 06/22/2019     06/22/2019    NEUTROABS 5.05 06/22/2019    GLUCOSE 268 (H) 06/22/2019    BUN 22 06/22/2019    CREATININE 1.24 06/22/2019    EGFRIFNONA 59 (L) 06/22/2019     (L) 06/22/2019    K 4.4 07/02/2019    CL 91 (L) 06/22/2019    CO2 23.0 06/22/2019    CALCIUM 9.3 06/22/2019    ALBUMIN 3.79 06/22/2019    AST 45 (H) 06/22/2019    ALT 36 06/22/2019    BILITOT 0.3 06/22/2019        I reviewed the patient's new clinical results.   *Pre-op serum sodium and glucose pending.    Cancer Staging (if applicable)  Cancer Patient: __ yes _X_no __unknown; If yes, clinical stage T:__ N:__M:__, stage group or __N/A    Assessment: Left sternoclavicular dislocation    Plan: Open reduction left sternoclavicular dislocation      Rylee Stone, APRN  7/3/2019   12:13 PM    "

## 2019-07-03 NOTE — OP NOTE
DATE OF OPERATION:  07/03/19  PREOPERATIVE DIAGNOSIS:   1.  Left posterior sternoclavicular dislocation     POSTOPERATIVE DIAGNOSES:  1.  left posterior sternoclavicular dislocation  PROCEDURES PERFORMED:  1.    Open reduction left posterior sternoclavicular dislocation with repair of sternoclavicular ligaments  SURGEON: Maynor Carey MD  Co-surgeon: Dr. Kj Foy   aSSISTANTS:  1. Samuel Pedraza MD, PGY-5  ANESTHESIA: General plus block.    ESTIMATED BLOOD LOSS:80 mL.    COMPLICATIONS: None.    DISPOSITION: Recovery room in stable condition.     INDICATIONS: This is a 59-year-old male with left posterior sternoclavicular dislocation after a fall from a roof.  He was initially seen in an outside hospital and due to delays in treatment sought care with me.  CT scan showed posterior dislocation of the left sternoclavicular joint with the clavicle adjacent to the aorta.  CT surgical consultation was made for co-surgery and preparations should vascular damage to be identified.  Risks, benefits, and alternatives to surgery were discussed including the potential life-threatening risk of surgical as well as nonsurgical management.  All questions were answered and the patient was ready and willing to proceed.    DESCRIPTION OF PROCEDURE: On the day of surgery, the patient identified the left  sternoclavicular joint as the correct operative extremity. This was initialed by the surgeon with the patient's acknowledgment. The patient underwent placement of a block and was taken to the operating room and placed in the supine position. Upon induction of adequate anesthesia, the patient had a roll placed between his shoulder blades and was then prepped and draped in the usual sterile fashion with access to the entire thorax.   Timeout confirmed the correct patient and operative extremity as well as that antibiotics were on board.  A curvilinear incision was made centered on the left sternoclavicular joint  and carried sharply  through the skin and subcutaneous tissue.  Hemostasis was obtained.  The periosteum of the clavicle was opened and the sternoclavicular joint was opened as well.  Sub periosteal dissection of the clavicle was carried out exposing the clavicle almost circumferentially.  A clamp was placed on the clavicle and with longitudinal traction on the arm and traction on the clavicle and countertraction on the sternum by Dr. Foy the clavicle was able to be exposed.  Complete disruption of the capsule at occurred.  Palpation of the great vessels was easily achievable through the stripped soft tissues.  Posterior capsular tissue was of incredibly poor quality and would not hold sutures.  To ensure stability to Mytec Zac anchors were inserted double loaded with Tara cord into the medial clavicle and used to repair the anterior capsular tissue.  #2 Ortho cord was used to repair the fascia overlying the clavicle and this was oversewn with 0 Vicryl.  Gentle pressure on the clavicle appeared to show a stable sternoclavicular joint.  The subcutaneous tissue was closed with 2-0 Vicryl.  Skin was closed with Monocryl and Dermabond followed by an Aquasol dressing.  He was then awakened and taken to the recovery room in stable condition.  He will be observed overnight and discharged home tomorrow pending stability.  They will be in a sling for 6 weeks with no range of motion.

## 2019-07-03 NOTE — ANESTHESIA PROCEDURE NOTES
Arterial Line      Patient location during procedure: pre-op   Line placed for hemodynamic monitoring.  Performed By   CRNA: Brianna Saenz CRNA  Preanesthetic Checklist  Completed: patient identified, site marked, surgical consent, pre-op evaluation, timeout performed, IV checked, risks and benefits discussed and monitors and equipment checked  Arterial Line Prep   Sterile Tech: cap, gloves and sterile barriers  Prep: ChloraPrep  Patient monitoring: blood pressure monitoring, continuous pulse oximetry and EKG  Arterial Line Procedure   Laterality:right  Location:  radial artery  Catheter size: 20 G   Guidance: palpation technique  Number of attempts: 1  Successful placement: yes          Post Assessment   Dressing Type: line sutured, occlusive dressing applied, secured with tape and wrist guard applied.   Complications no  Circ/Move/Sens Assessment: normal and unchanged.   Patient Tolerance: patient tolerated the procedure well with no apparent complications

## 2019-07-03 NOTE — ANESTHESIA PROCEDURE NOTES
Superficial Cervical Plexus Block      Performed by  CRNA: Brianna Saenz CRNA  Assisted by: Donna Ruiz PTA  Preanesthetic Checklist  Completed: patient identified, site marked, surgical consent, pre-op evaluation, timeout performed, IV checked, risks and benefits discussed and monitors and equipment checked  Prep:  Pt Position: right lateral decubitus  Sterile barriers:cap, gloves and sterile barriers  Prep: ChloraPrep  Patient monitoring: blood pressure monitoring, continuous pulse oximetry and EKG  Procedure  Sedation:no    Guidance:ultrasound guided  Images:still images not obtained    Laterality:leftInjection Technique:single-shotNeedle Gauge:20 G      Medications Used: buprenorphine (BUPRENEX) injection, 0.3 mg  dexamethasone sodium phosphate injection, 4 mg  bupivacaine PF (MARCAINE) 0.25 % injection, 10 mL  Med admintered at 7/3/2019 12:42 PM      Post Assessment  Patient Tolerance:comfortable throughout block  Complications:no

## 2019-07-03 NOTE — CONSULTS
Consult Note  Shamir Conner  [id number]  1960    Referring Provider:  Reason for Consultation: Posterior dislocation of the sternal clavicular joint     Patient Care Team:  Esther Porter APRN as PCP - General (Family Medicine)    Chief complaint: Sternal pain      History of present illness: I been asked to see this patient with Dr. Damaris Dillon for evaluation.  The patient has had a left sternoclavicular dislocation with displacement of the clavicular head into the mediastinum area.  There is concern about possible bleeding when this is attempted to be placed back in a normal position.  We then consulted to evaluate this and to be available should the need arise.    Review of Systems    The following systems were reviewed and negative;  constitution, eyes, ENT, respiratory, cardiovascular, gastrointestinal, genitourinary, integument, breast, hematologic / lymphatic, neurological, behavioral/psych, endocrine and allergies / immunologic    History  Past Medical History:   Diagnosis Date   • Arthritis    • Disease of thyroid gland     hypothyroidism    • Dislocation of sternoclavicular joint 2019    left due to fall    • History of fall 2019    dislocated left shoulder and injured knees    • Hyperlipidemia    • Hypertension    • Sleep apnea with use of continuous positive airway pressure (CPAP)    • Wears reading eyeglasses    , Past Surgical History:   Procedure Laterality Date   • APPENDECTOMY     • COLONOSCOPY     • HERNIA REPAIR      umbilical and ventral wall hernia    • REPLACEMENT TOTAL KNEE Right    , History reviewed. No pertinent family history., Social History     Tobacco Use   • Smoking status: Former Smoker     Packs/day: 3.00     Years: 12.00     Pack years: 36.00     Types: Cigarettes     Last attempt to quit:      Years since quittin.5   • Smokeless tobacco: Never Used   Substance Use Topics   • Alcohol use: No     Frequency: Never   • Drug use: No   , Medications  "Prior to Admission   Medication Sig Dispense Refill Last Dose   • amitriptyline (ELAVIL) 100 MG tablet Take 100 mg by mouth Every Night.   7/1/2019 at 2100   • aspirin 81 MG EC tablet Take 81 mg by mouth Every Evening.   7/1/2019   • fenofibrate (TRICOR) 145 MG tablet Take 145 mg by mouth Every Evening.   7/1/2019   • ibuprofen (ADVIL,MOTRIN) 400 MG tablet Take 400 mg by mouth Every 6 (Six) Hours As Needed for Mild Pain .   7/1/2019   • levothyroxine (SYNTHROID, LEVOTHROID) 25 MCG tablet Take 25 mcg by mouth Every Evening.   7/1/2019   • lisinopril (PRINIVIL,ZESTRIL) 40 MG tablet Take 40 mg by mouth Every Evening.   7/1/2019   • oxyCODONE-acetaminophen (PERCOCET)  MG per tablet Take 1 tablet by mouth Every 6 (Six) Hours As Needed for Moderate Pain .   7/1/2019   • traMADol (ULTRAM) 50 MG tablet Take 50 mg by mouth Every 6 (Six) Hours As Needed for Moderate Pain .   7/1/2019      Scheduled Meds:    acetaminophen 1,000 mg Oral Once   ceFAZolin 3 g Intravenous Once   famotidine 20 mg Oral 60 Min Pre-Op   sodium chloride 3 mL Intravenous Q12H   vancomycin 2,250 mg Intravenous Once      Continuous Infusions:    lactated ringers 9 mL/hr      PRN Meds:  lactated ringers  •  lidocaine PF 1%  •  sodium chloride and Allergies:  Patient has no known allergies.    Objective     Vital Sign Min/Max for last 24 hours  Temp  Min: 98.4 °F (36.9 °C)  Max: 98.4 °F (36.9 °C)   BP  Min: 114/82  Max: 114/82   Pulse  Min: 95  Max: 95   Resp  Min: 18  Max: 18   SpO2  Min: 95 %  Max: 95 %   No Data Recorded   Weight  Min: 151 kg (332 lb 6.4 oz)  Max: 151 kg (332 lb 6.4 oz)     Flowsheet Rows      First Filed Value   Admission Height  190.5 cm (75\") Documented at 07/03/2019 1149   Admission Weight  151 kg (332 lb 6.4 oz)  (Abnormal)  Documented at 07/03/2019 1149          Physical Exam:     General Appearance:    Alert, cooperative, in no acute distress   Head:    Normocephalic, without obvious abnormality, atraumatic   Eyes:           "  Lids and lashes normal, conjunctivae and sclerae normal, no   icterus, no pallor, corneas clear, PERRLA   Ears:    Ears appear intact with no abnormalities noted   Throat:   No oral lesions, no thrush, oral mucosa moist   Neck:   No adenopathy, supple, trachea midline, no thyromegaly, no   carotid bruit, no JVD   Back:     No kyphosis present, no scoliosis present, no skin lesions,      erythema or scars, no tenderness to percussion or                   palpation,   range of motion normal   Lungs:     Clear to auscultation,respirations regular, even and                  unlabored    Heart:    Regular rhythm and normal rate, normal S1 and S2, no            murmur, no gallop, no rub, no click   Chest Wall:   Posterior dislocation of the left sternoclavicular head   Abdomen:     Normal bowel sounds, no masses, no organomegaly, soft        non-tender, non-distended, no guarding, no rebound                tenderness   Rectal:     Deferred   Extremities:   Moves all extremities well, no edema, no cyanosis, no             redness   Pulses:   Pulses palpable and equal bilaterally   Skin:   No bleeding, bruising or rash   Lymph nodes:   No palpable adenopathy   Neurologic:   Cranial nerves 2 - 12 grossly intact, sensation intact, DTR       present and equal bilaterally             Assessment/Plan       * No active hospital problems. *      The patient recently has fallen.  He is been evaluated with x-ray.  He has a posterior dislocation of the sternoclavicular head into the mediastinal area.   suggested he is evaluated him and asked me to be available to assist him in positioning this with surgery.  I have obtained the CT scans and reviewed it.  I concur there appears to be placement of the clavicular head into the mediastinum near the aorta and brachiocephalic vein.  Certainly this could be risk of bleeding with movement of this.  I had a long discussion with the patient about the possibility of doing a sternotomy  incision if this should occur.  Of explained the risk, and alternatives.  He appears to understand all the above and desires to proceed.  I would like to thank you for this consultation.    I discussed the patients findings and my recommendations with patient, nursing staff and consulting provider      Please note that portions of this note were completed with a voice recognition program. Efforts were made to edit the dictations, but words may be mistranscribed    Kj Foy MD  07/03/19  12:01 PM

## 2019-07-03 NOTE — ANESTHESIA PREPROCEDURE EVALUATION
Anesthesia Evaluation     Patient summary reviewed and Nursing notes reviewed   NPO Solid Status: > 8 hours  NPO Liquid Status: > 4 hours           Airway   Mallampati: II  TM distance: >3 FB  Neck ROM: full  No difficulty expected  Dental - normal exam     Comment: Few teeth, nothing loose    Pulmonary - normal exam   (+) sleep apnea on CPAP,   Cardiovascular - normal exam    ECG reviewed    (+) hypertension, hyperlipidemia,       Neuro/Psych  GI/Hepatic/Renal/Endo    (+) morbid obesity,  hypothyroidism,     Musculoskeletal     Abdominal  - normal exam    Bowel sounds: normal.   Substance History      OB/GYN          Other   (+) arthritis                   Anesthesia Plan    ASA 3     general   (DERRICK, SUPERFICIAL CERVICAL PLEXUS BLOCK, DOUBLE LUMEN TUBE)  intravenous induction   Anesthetic plan, all risks, benefits, and alternatives have been provided, discussed and informed consent has been obtained with: patient.    Plan discussed with CRNA.

## 2019-07-03 NOTE — PLAN OF CARE
Problem: Patient Care Overview  Goal: Plan of Care Review  Outcome: Ongoing (interventions implemented as appropriate)   07/03/19 9645   Coping/Psychosocial   Plan of Care Reviewed With patient   Plan of Care Review   Progress improving   OTHER   Outcome Summary pt alert and oriented; shoulder in sling; comfortable; ice on shoulder; vss; no c/o pain at this time. will continue to monitor. neuro intact-can wiggle all fingers and squeeze with affected hand.      Goal: Individualization and Mutuality  Outcome: Ongoing (interventions implemented as appropriate)    Goal: Discharge Needs Assessment  Outcome: Ongoing (interventions implemented as appropriate)    Goal: Interprofessional Rounds/Family Conf  Outcome: Ongoing (interventions implemented as appropriate)      Problem: Fall Risk (Adult)  Goal: Identify Related Risk Factors and Signs and Symptoms  Outcome: Ongoing (interventions implemented as appropriate)    Goal: Absence of Fall  Outcome: Ongoing (interventions implemented as appropriate)      Problem: Surgery Nonspecified (Adult)  Goal: Signs and Symptoms of Listed Potential Problems Will be Absent, Minimized or Managed (Surgery Nonspecified)  Outcome: Ongoing (interventions implemented as appropriate)    Goal: Anesthesia/Sedation Recovery  Outcome: Ongoing (interventions implemented as appropriate)

## 2019-07-03 NOTE — CONSULTS
[facility]  CONSULT NOTE      Patient Name: Shamir Conner  : 1960  MRN: [id number]    Primary Care Physician: Esther Porter APRN  Provider requesting consultation: Maynor Carey MD    Subjective   Subjective     Reason for Consultation:  Medical management     HPI:  Shamir Conner is a 59 y.o. male presented for an elective open reduction for left posterior sternoclavicular dislocation with repair of sternoclavicular ligaments under co-surgery by Dr Carey and Dr Foy due to his clavicular dislocation being positioned in the mediastinum near the aorta and brachiocephalic vein. No immediate postoperative complications. Hospital medicine consulted for medical management.     Patient seen resting in bed, NAD. Family in room. Pain not well controlled currently, asking for pain medicine. Voiding spontaneously and asking to eat. No new concerns.     Review of Systems  Gen- No fevers, chills  CV- No chest pain, palpitations  Resp- No cough, dyspnea  GI- No N/V/D, abd pain     Otherwise complete ROS reviewed and is negative except as mentioned in the HPI.    Past Medical History:   Diagnosis Date   • Arthritis    • Disease of thyroid gland     hypothyroidism    • Dislocation of sternoclavicular joint 2019    left due to fall    • History of fall 2019    dislocated left shoulder and injured knees    • Hyperlipidemia    • Hypertension    • Sleep apnea with use of continuous positive airway pressure (CPAP)    • Wears reading eyeglasses        Past Surgical History:   Procedure Laterality Date   • APPENDECTOMY     • COLONOSCOPY     • HERNIA REPAIR      umbilical and ventral wall hernia    • REPLACEMENT TOTAL KNEE Right        Family History: family history is not on file. Otherwise pertinent FHx was reviewed and unremarkable.     Social History:  reports that he quit smoking about 24 years ago. His smoking use included cigarettes. He has a  36.00 pack-year smoking history. He has never used smokeless tobacco. He reports that he does not drink alcohol or use drugs.    Medications:  Medications Prior to Admission   Medication Sig Dispense Refill Last Dose   • amitriptyline (ELAVIL) 100 MG tablet Take 100 mg by mouth Every Night.      • aspirin 81 MG EC tablet Take 81 mg by mouth Every Evening.      • fenofibrate (TRICOR) 145 MG tablet Take 145 mg by mouth Every Evening.      • ibuprofen (ADVIL,MOTRIN) 400 MG tablet Take 400 mg by mouth Every 6 (Six) Hours As Needed for Mild Pain .      • levothyroxine (SYNTHROID, LEVOTHROID) 25 MCG tablet Take 25 mcg by mouth Every Evening.      • lisinopril (PRINIVIL,ZESTRIL) 40 MG tablet Take 40 mg by mouth Every Evening.      • oxyCODONE-acetaminophen (PERCOCET)  MG per tablet Take 1 tablet by mouth Every 6 (Six) Hours As Needed for Moderate Pain .      • traMADol (ULTRAM) 50 MG tablet Take 50 mg by mouth Every 6 (Six) Hours As Needed for Moderate Pain .          Scheduled Meds:    [START ON 7/4/2019] amitriptyline 100 mg Oral Nightly   [START ON 7/4/2019] fenofibrate 145 mg Oral Daily   [START ON 7/4/2019] levothyroxine 25 mcg Oral Q PM   [START ON 7/4/2019] lisinopril 40 mg Oral Q PM   [START ON 7/4/2019] vancomycin 15 mg/kg Intravenous Once     Continuous Infusions:    sodium chloride 50 mL/hr Last Rate: 50 mL/hr (07/03/19 0254)     PRN Meds:.bisacodyl  •  docusate sodium  •  HYDROcodone-acetaminophen  •  HYDROmorphone **AND** naloxone  •  magnesium hydroxide  •  ondansetron **OR** ondansetron    No Known Allergies    Objective   Objective     Vital Signs:   Temp:  [97 °F (36.1 °C)-98.4 °F (36.9 °C)] 97.3 °F (36.3 °C)  Heart Rate:  [71-95] 84  Resp:  [18] 18  BP: (114-140)/(79-94) 139/80     Physical Exam  Constitutional: Awake, alert, watching TV   Eyes: PERRLA, sclerae anicteric, no conjunctival injection  HENT: NCAT, mucous membranes moist  Neck: Supple, no thyromegaly, no lymphadenopathy, trachea  midline  Respiratory: Clear to auscultation bilaterally, nonlabored respirations   Cardiovascular: RRR, no murmurs, rubs, or gallops, palpable pedal pulses bilaterally  Gastrointestinal: Positive bowel sounds, soft, nontender, nondistended  Musculoskeletal: No bilateral ankle edema, no clubbing or cyanosis to extremities, LUE in sling with CDI dressing over medial left clavicle; + sensation and movement in left hand + radial pulse   Psychiatric: Appropriate affect, cooperative  Neurologic: Oriented x 3, strength symmetric in all extremities, Cranial Nerves grossly intact to confrontation, speech clear  Skin: No rashes     Results Reviewed:  I have personally reviewed current lab, radiology, and data and agree.        Results from last 7 days   Lab Units 07/03/19  1208 07/02/19  1506   SODIUM mmol/L 130*  --    POTASSIUM mmol/L  --  4.4     Estimated Creatinine Clearance: 99.5 mL/min (by C-G formula based on SCr of 1.24 mg/dL).  Brief Urine Lab Results     None        No results found for: BNP    Microbiology Results Abnormal     None          Imaging Results (last 24 hours)     Procedure Component Value Units Date/Time    XR Chest 1 View [[id number]] Collected:  07/03/19 1532     Updated:  07/03/19 1611    Narrative:          EXAMINATION: XR CHEST 1 VW - 07/03/2019      INDICATION: Post-op subclavian reduction.     COMPARISON: NONE     FINDINGS: Single frontal chest radiograph was submitted in this post-op  patient from surgical fixation of a posterior dislocated left clavicle.  Low lung volumes are noted. Bibasilar atelectasis is identified. The  lungs are otherwise clear. No pneumothorax. No sizable pleural effusion.  The heart is prominent in size although favored to relate to the  portable technique and incomplete inspiration of the film. Slight  asymmetry involving the left clavicular head is noted bilaterally,  although improved when compared to the chest radiograph performed  07/02/2019.           Impression:        Minimal bibasilar atelectasis.      DICTATED:   07/03/2019  EDITED/ls :   07/03/2019      This report was finalized on 7/3/2019 4:08 PM by Dr. King Wood MD.                Assessment/Plan   Assessment / Plan     Active Hospital Problems    Diagnosis POA   • Posterior dislocation of left sternoclavicular joint [S43.225A] Yes   • Hypothyroidism (acquired) [E03.9] Unknown   • MEGA on CPAP [G47.33, Z99.89] Not Applicable   • Essential hypertension [I10] Unknown   • HLD (hyperlipidemia) [E78.5] Unknown       S/p open left sternoclavicular joint repair  - care per Dr Carey   - pain control  -CBC/ BMP in am     Hyponatremia  --130  --bmp in am     New onset DM   --A1c 8.5  --LDSSI with ACHS accuchecks  --DM educator to see tomorrow   --follow up PCP in 2 weeks    HTN  -cont Lisinopril in AM     HLD  -Cont Tricor tomorrow     MEGA  -CPAP tonight     Hypothyroidism   -Synthroid in AM       Thank you for allowing [facility] to provide consultative care for your patient, we will continue to follow while clinically appropriate.    Electronically signed by DAVIN Cartagena, 07/03/19, 5:57 PM.

## 2019-07-03 NOTE — BRIEF OP NOTE
CLAVICLE OPEN REDUCTION INTERNAL FIXATION  Progress Note    Shamir Conner  7/3/2019    Pre-op Diagnosis:   Posterior dislocation of left sternoclavicular joint, initial encounter [S43.225A]       Post-Op Diagnosis Codes:     * Posterior dislocation of left sternoclavicular joint, initial encounter [S43.225A]    Procedure/CPT® Codes:  NC OPEN RX DYKES-CLAV DISLOC [40840]    Procedure(s):  OPEN REDUCTION LEFT STERNOCLAVICULAR DISLOCATION    Surgeon(s):  Maynor Carey MD King, John D, MD Rogers, Anthony G, MD    Anesthesia: General with Block    Staff:   Circulator: Nigel Majano, SLOANE; Libby Gonzales RN; Ayde Stout RN; Jamaica Schuler RN  Radiology Technologist: Bella Pruett RT  Scrub Person: Mara Galaviz; Lonnie Clark  Nursing Assistant: Stephanie Vaz  Assistant: Abner Chester PA    Estimated Blood Loss: 80ml    Urine Voided: * No values recorded between 7/3/2019  1:10 PM and 7/3/2019  2:55 PM *    Specimens:                None          Drains:      Findings: per dictation    Complications: none      Maynor Carey MD     Date: 7/3/2019  Time: 2:55 PM

## 2019-07-03 NOTE — OP NOTE
Operative Report  Shamir Conner  [id number]  1960    Preop Diagnosis: Severe posterior dislocation of the left sternoclavicular joint        Postop Diagnosis same        Procedure: Repair of posterior dislocation of left sternoclavicular joint        Surgeons: Co-surgeons Dr. Maynor Carey and Kj Foy        Assistant: Mohan Chester        Operative Findings: Severe posterior dislocation of the left sternoclavicular joint with the head of the clavicle being abutted against the transverse arch of the aorta inside the mediastinum        Description: The procedure was done as co-surgeons with Dr. Carey and myself.  Proceeded to make a incision from mid clavicle to the angle of Charles and a curved fashion.  Proceed dissect down to the muscle.  Proceeded to using a elevator to elevate the fascia and muscle off of the proximal portion of the clavicle.  The clavicular head was deep in the mediastinum underneath the sternum.  At this time with retraction on the arm and retraction on the midportion of the left clavicle with a clamp and as well as retraction on the sternum with a bone hook we were able to deliver the clavicle out of the mediastinum into proper position.  There was no significant bleeding noted at this point.  At this point Dr. Carey proceeded to do his surgical repair of this.  We then closed the muscle layer over the bone and the subcutaneous tissues and skin were closed.        EBL: 150 cc      Please note that portions of this note were completed with a voice recognition program. Efforts were made to edit the dictations, but words may be mistranscribed      Kj Foy MD  07/03/19 2:24 PM

## 2019-07-03 NOTE — ANESTHESIA POSTPROCEDURE EVALUATION
Patient: Shamir Conner    Procedure Summary     Date:  07/03/19 Room / Location:   [facility]    Anesthesia Start:  1310 Anesthesia Stop:      Procedure:  OPEN REDUCTION LEFT STERNOCLAVICULAR DISLOCATION (Left Shoulder) Diagnosis:  Posterior dislocation of left sternoclavicular joint, initial encounter    Surgeon:  Maynor Carey MD Provider:  Jovanni De La Torre MD    Anesthesia Type:  general ASA Status:  3          Anesthesia Type: general  Last vitals  BP   114/82 (07/03/19 1149)   Temp   98.4 °F (36.9 °C) (07/03/19 1149)   Pulse   95 (07/03/19 1149)   Resp   18 (07/03/19 1149)     SpO2   95 % (07/03/19 1149)     Post Anesthesia Care and Evaluation    Patient location during evaluation: PACU  Patient participation: complete - patient participated  Level of consciousness: awake and alert  Pain score: 0  Pain management: adequate  Airway patency: patent  Anesthetic complications: No anesthetic complications  PONV Status: none  Cardiovascular status: hemodynamically stable and acceptable  Respiratory status: nonlabored ventilation, acceptable and nasal cannula  Hydration status: acceptable

## 2019-07-03 NOTE — ANESTHESIA PROCEDURE NOTES
Airway  Urgency: elective    Airway not difficult    General Information and Staff    Patient location during procedure: OR  CRNA: Angel Watson CRNA    Indications and Patient Condition  Indications for airway management: airway protection    Preoxygenated: yes  MILS not maintained throughout  Mask difficulty assessment: 1 - vent by mask    Final Airway Details  Final airway type: endotracheal airway      Successful airway: ETT and ETT - double lumen left  Cuffed: yes   Successful intubation technique: direct laryngoscopy  Endotracheal tube insertion site: oral  Blade: Khang  Blade size: 3  ETT size (mm): 7.0  ETT DL size (fr): 41  Cormack-Lehane Classification: grade I - full view of glottis  Placement verified by: chest auscultation and capnometry   Measured from: lips  ETT to lips (cm): 20  Number of attempts at approach: 1    Additional Comments  Negative epigastric sounds, Breath sound equal bilaterally with symmetric chest rise and fall

## 2019-07-04 VITALS
SYSTOLIC BLOOD PRESSURE: 130 MMHG | WEIGHT: 315 LBS | HEIGHT: 75 IN | BODY MASS INDEX: 39.17 KG/M2 | DIASTOLIC BLOOD PRESSURE: 87 MMHG | RESPIRATION RATE: 16 BRPM | TEMPERATURE: 98.6 F | OXYGEN SATURATION: 95 % | HEART RATE: 69 BPM

## 2019-07-04 LAB
ANION GAP SERPL CALCULATED.3IONS-SCNC: 11 MMOL/L (ref 5–15)
BASOPHILS # BLD AUTO: 0.03 10*3/MM3 (ref 0–0.2)
BASOPHILS NFR BLD AUTO: 0.2 % (ref 0–1.5)
BUN BLD-MCNC: 23 MG/DL (ref 8–23)
BUN/CREAT SERPL: 22.3 (ref 7–25)
CALCIUM SPEC-SCNC: 8.8 MG/DL (ref 8.6–10.5)
CHLORIDE SERPL-SCNC: 96 MMOL/L (ref 98–107)
CO2 SERPL-SCNC: 24 MMOL/L (ref 22–29)
CREAT BLD-MCNC: 1.03 MG/DL (ref 0.76–1.27)
DEPRECATED RDW RBC AUTO: 40.1 FL (ref 37–54)
EOSINOPHIL # BLD AUTO: 0 10*3/MM3 (ref 0–0.4)
EOSINOPHIL NFR BLD AUTO: 0 % (ref 0.3–6.2)
ERYTHROCYTE [DISTWIDTH] IN BLOOD BY AUTOMATED COUNT: 13.2 % (ref 12.3–15.4)
GFR SERPL CREATININE-BSD FRML MDRD: 74 ML/MIN/1.73
GLUCOSE BLD-MCNC: 208 MG/DL (ref 65–99)
GLUCOSE BLDC GLUCOMTR-MCNC: 175 MG/DL (ref 70–130)
GLUCOSE BLDC GLUCOMTR-MCNC: 255 MG/DL (ref 70–130)
HCT VFR BLD AUTO: 36.7 % (ref 37.5–51)
HGB BLD-MCNC: 12.5 G/DL (ref 13–17.7)
IMM GRANULOCYTES # BLD AUTO: 0.08 10*3/MM3 (ref 0–0.05)
IMM GRANULOCYTES NFR BLD AUTO: 0.6 % (ref 0–0.5)
LYMPHOCYTES # BLD AUTO: 1.3 10*3/MM3 (ref 0.7–3.1)
LYMPHOCYTES NFR BLD AUTO: 9.7 % (ref 19.6–45.3)
MCH RBC QN AUTO: 28.3 PG (ref 26.6–33)
MCHC RBC AUTO-ENTMCNC: 34.1 G/DL (ref 31.5–35.7)
MCV RBC AUTO: 83 FL (ref 79–97)
MONOCYTES # BLD AUTO: 1.11 10*3/MM3 (ref 0.1–0.9)
MONOCYTES NFR BLD AUTO: 8.3 % (ref 5–12)
NEUTROPHILS # BLD AUTO: 10.85 10*3/MM3 (ref 1.7–7)
NEUTROPHILS NFR BLD AUTO: 81.2 % (ref 42.7–76)
NRBC BLD AUTO-RTO: 0 /100 WBC (ref 0–0.2)
PLATELET # BLD AUTO: 331 10*3/MM3 (ref 140–450)
PMV BLD AUTO: 9.8 FL (ref 6–12)
POTASSIUM BLD-SCNC: 4.6 MMOL/L (ref 3.5–5.2)
RBC # BLD AUTO: 4.42 10*6/MM3 (ref 4.14–5.8)
SODIUM BLD-SCNC: 131 MMOL/L (ref 136–145)
WBC NRBC COR # BLD: 13.37 10*3/MM3 (ref 3.4–10.8)

## 2019-07-04 PROCEDURE — 94799 UNLISTED PULMONARY SVC/PX: CPT

## 2019-07-04 PROCEDURE — 94660 CPAP INITIATION&MGMT: CPT

## 2019-07-04 PROCEDURE — 99024 POSTOP FOLLOW-UP VISIT: CPT | Performed by: THORACIC SURGERY (CARDIOTHORACIC VASCULAR SURGERY)

## 2019-07-04 PROCEDURE — 99217 PR OBSERVATION CARE DISCHARGE MANAGEMENT: CPT | Performed by: NURSE PRACTITIONER

## 2019-07-04 PROCEDURE — 63710000001 INSULIN LISPRO (HUMAN) PER 5 UNITS: Performed by: NURSE PRACTITIONER

## 2019-07-04 PROCEDURE — 80048 BASIC METABOLIC PNL TOTAL CA: CPT | Performed by: ORTHOPAEDIC SURGERY

## 2019-07-04 PROCEDURE — 97535 SELF CARE MNGMENT TRAINING: CPT | Performed by: OCCUPATIONAL THERAPIST

## 2019-07-04 PROCEDURE — 85025 COMPLETE CBC W/AUTO DIFF WBC: CPT | Performed by: ORTHOPAEDIC SURGERY

## 2019-07-04 PROCEDURE — 25010000002 VANCOMYCIN 10 G RECONSTITUTED SOLUTION: Performed by: ORTHOPAEDIC SURGERY

## 2019-07-04 PROCEDURE — 82962 GLUCOSE BLOOD TEST: CPT

## 2019-07-04 PROCEDURE — 97165 OT EVAL LOW COMPLEX 30 MIN: CPT | Performed by: OCCUPATIONAL THERAPIST

## 2019-07-04 RX ORDER — OXYCODONE AND ACETAMINOPHEN 10; 325 MG/1; MG/1
1 TABLET ORAL EVERY 6 HOURS PRN
Qty: 20 TABLET | Refills: 0 | OUTPATIENT
Start: 2019-07-04 | End: 2020-03-27

## 2019-07-04 RX ADMIN — INSULIN LISPRO 2 UNITS: 100 INJECTION, SOLUTION INTRAVENOUS; SUBCUTANEOUS at 07:44

## 2019-07-04 RX ADMIN — HYDROCODONE BITARTRATE AND ACETAMINOPHEN 1 TABLET: 10; 325 TABLET ORAL at 07:26

## 2019-07-04 RX ADMIN — INSULIN LISPRO 4 UNITS: 100 INJECTION, SOLUTION INTRAVENOUS; SUBCUTANEOUS at 11:14

## 2019-07-04 RX ADMIN — HYDROCODONE BITARTRATE AND ACETAMINOPHEN 1 TABLET: 10; 325 TABLET ORAL at 03:18

## 2019-07-04 RX ADMIN — HYDROCODONE BITARTRATE AND ACETAMINOPHEN 1 TABLET: 10; 325 TABLET ORAL at 11:14

## 2019-07-04 RX ADMIN — VANCOMYCIN HYDROCHLORIDE 2250 MG: 10 INJECTION, POWDER, LYOPHILIZED, FOR SOLUTION INTRAVENOUS at 00:18

## 2019-07-04 NOTE — DISCHARGE SUMMARY
[facility]  DISCHARGE SUMMARY    Patient Name: Shamir Conner  : 1960  MRN: [id number]    Date of Admission: 7/3/2019  Date of Discharge:  2019  Primary Care Physician: Esther Porter APRN    Consults     Date and Time Order Name Status Description    7/3/2019 1604 Inpatient Consult to Hospitalist Completed         Hospital Course   Presenting Problem:   Posterior dislocation of left sternoclavicular joint, initial encounter [S43.225A]    Active Hospital Problems    Diagnosis  POA   • Posterior dislocation of left sternoclavicular joint [S43.225A]  Yes   • Hypothyroidism (acquired) [E03.9]  Yes   • MEGA on CPAP [G47.33, Z99.89]  Not Applicable   • Essential hypertension [I10]  Yes   • HLD (hyperlipidemia) [E78.5]  Yes      Resolved Hospital Problems   No resolved problems to display.      Hospital Course:  Shamir Conner is a 59 y.o. male with past medical history significant for hypothyroidism, MEGA on CPAP, HTN and HLD who presented to urgent treatment center in [facility] after he fell off the roof of his mobile home and complained of left shoulder pain.  Patient was told he had a clavicle injury and was sent to follow-up at [facility] where he was told he had a posterior sternoclavicular dislocation of his left clavicle that was sitting on the aorta.  Patient was discharged from , so he came to [facility] on 7/3/2019 where he was referred to Dr. Carey who recommended surgical intervention due to the possibility that the fragmented bone could tear or erode into the aorta.  Hospital medicine service was consulted to follow patient for his hypothyroidism, sleep apnea, hypertension and HLD.  Discharge recommendations and follow-ups are listed below.    Discharge Follow Up Recommendations for labs/diagnostics:  --Follow-up with your family doctor as soon as possible for better diabetic control.  --Follow-up with Dr. Carey in 1 week; call the office  Monday to make an appointment  --Keep your sling on at all times  --Check your blood sugar at least 4 times a day and documented for your family doctor  --Take your metformin in the morning as scheduled until adjusted by your family doctor  Day of Discharge   HPI:   Patient sitting up in bed in NAD.  Patient states he has no complaints.  Pain is controlled.  Positive BM.  Patient states he is ready to go home.  Family in the room.    Review of Systems  Gen- No fevers, chills  CV- No chest pain, palpitations  Resp- No cough, dyspnea  GI- No N/V/D, abd pain  Otherwise ROS is negative except as mentioned in the HPI.    Vital Signs:   Temp:  [97 °F (36.1 °C)-98.7 °F (37.1 °C)] 98.6 °F (37 °C)  Heart Rate:  [] 69  Resp:  [16-18] 16  BP: (114-151)/(76-94) 130/87  FiO2 (%):  [40 %] 40 %   Physical Exam:  Constitutional: Alert, WD, morbidly obese male sitting up in bed in [facility]  Head: NCAT  ENT: [place], moist mucous membranes   Respiratory: Non-labored, symmetrical chest expansion, CTAB  Cardiovascular: RRR, no M/R/G, +DP pulses bilaterally  Gastrointestinal: Soft, NT, ND +BS  Musculoskeletal: METZ; no LE edema bilaterally; left arm in sling; anterior chest surgical dressing CDI; distal left extremities neurovascularly intact  Neurologic: Oriented x4, strength symmetric in all extremities, follows all commands, speech clear  Skin: No rashes on exposed skin  Psychiatric:Pleasant and cooperative; normal affect  Pertinent  and/or Most Recent Results     Results from last 7 days   Lab Units 07/04/19  0534 07/03/19  1208 07/02/19  1506   WBC 10*3/mm3 13.37*  --   --    HEMOGLOBIN g/dL 12.5*  --   --    HEMATOCRIT % 36.7*  --   --    PLATELETS 10*3/mm3 331  --   --    SODIUM mmol/L 131* 130*  --    POTASSIUM mmol/L 4.6  --  4.4   CHLORIDE mmol/L 96*  --   --    CO2 mmol/L 24.0  --   --    BUN mg/dL 23  --   --    CREATININE mg/dL 1.03  --   --    GLUCOSE mg/dL 208*  --   --    CALCIUM mg/dL 8.8  --   --            Invalid  input(s): PROT, LABALBU        Invalid input(s): TG, LDLCALC, LDLREALC  Results from last 7 days   Lab Units 07/02/19  1506   HEMOGLOBIN A1C % 8.50*     Brief Urine Lab Results     None        Microbiology Results Abnormal     None        Imaging Results (all)     Procedure Component Value Units Date/Time    XR Chest 1 View [[id number]] Collected:  07/03/19 1532     Updated:  07/03/19 1611    Narrative:          EXAMINATION: XR CHEST 1 VW - 07/03/2019      INDICATION: Post-op subclavian reduction.     COMPARISON: NONE     FINDINGS: Single frontal chest radiograph was submitted in this post-op  patient from surgical fixation of a posterior dislocated left clavicle.  Low lung volumes are noted. Bibasilar atelectasis is identified. The  lungs are otherwise clear. No pneumothorax. No sizable pleural effusion.  The heart is prominent in size although favored to relate to the  portable technique and incomplete inspiration of the film. Slight  asymmetry involving the left clavicular head is noted bilaterally,  although improved when compared to the chest radiograph performed  07/02/2019.           Impression:       Minimal bibasilar atelectasis.      DICTATED:   07/03/2019  EDITED/ls :   07/03/2019      This report was finalized on 7/3/2019 4:08 PM by Dr. King Wood MD.           Discharge Details        Discharge Medications      New Medications      Instructions Start Date   metFORMIN 500 MG tablet  Commonly known as:  GLUCOPHAGE   500 mg, Oral, Daily With Breakfast         Continue These Medications      Instructions Start Date   amitriptyline 100 MG tablet  Commonly known as:  ELAVIL   100 mg, Oral, Nightly      aspirin 81 MG EC tablet   81 mg, Oral, Every Evening      fenofibrate 145 MG tablet  Commonly known as:  TRICOR   145 mg, Oral, Every Evening      ibuprofen 400 MG tablet  Commonly known as:  ADVIL,MOTRIN   400 mg, Oral, Every 6 Hours PRN      levothyroxine 25 MCG tablet  Commonly known as:  SYNTHROID,  LEVOTHROID   25 mcg, Oral, Every Evening      lisinopril 40 MG tablet  Commonly known as:  PRINIVIL,ZESTRIL   40 mg, Oral, Every Evening      oxyCODONE-acetaminophen  MG per tablet  Commonly known as:  PERCOCET   1 tablet, Oral, Every 6 Hours PRN      traMADol 50 MG tablet  Commonly known as:  ULTRAM   50 mg, Oral, Every 6 Hours PRN           No Known Allergies    Discharge Disposition:  Home or Self Care    Discharge Diet:  Diet Order   Procedures   • Diet Regular; Cardiac, Consistent Carbohydrate     Discharge Activity:   Activity Instructions     Activity as Tolerated      Other Activity Instructions      Activity Instructions: Per Dr Carey        CODE STATUS:    Code Status and Medical Interventions:   Ordered at: 07/03/19 1604     Code Status:    CPR     Medical Interventions (Level of Support Prior to Arrest):    Full     No future appointments.    Additional Instructions for the Follow-ups that You Need to Schedule     Discharge Follow-up with PCP   As directed       Currently Documented PCP:    Esther Porter APRN    PCP Phone Number:    [phone]     Follow Up Details:  1 week; your doctor Monday to schedule an appointment for diabetes management; A1c 8.5         Discharge Follow-up with Specified Provider: 1 Week   As directed      Follow Up:  1 Week         Discharge Follow-up with Specified Provider: 1 week; Dr. Carey; call the office Monday and schedule an appointment   As directed      To:  1 week; Dr. Carey; call the office Monday and schedule an appointment             Time Spent on Discharge:  35 minutes    Electronically signed by DAVIN Bonner, 07/04/19, 10:15 AM.

## 2019-07-04 NOTE — THERAPY DISCHARGE NOTE
Acute Care - Occupational Therapy Initial Eval/Discharge  [facility]     Patient Name: Shamir Conner  : 1960  MRN: [id number]  Today's Date: 2019  Onset of Illness/Injury or Date of Surgery: 19  Date of Referral to OT: 19  Referring Physician: Dr. Carey      Admit Date: 7/3/2019       ICD-10-CM ICD-9-CM   1. Type 2 diabetes mellitus without complication, without long-term current use of insulin (CMS/[facility]) E11.9 250.00   2. Impaired mobility and ADLs Z74.09 799.89     Patient Active Problem List   Diagnosis   • Posterior dislocation of left sternoclavicular joint   • Hypothyroidism (acquired)   • MEGA on CPAP   • Essential hypertension   • HLD (hyperlipidemia)     Past Medical History:   Diagnosis Date   • Arthritis    • Disease of thyroid gland     hypothyroidism    • Dislocation of sternoclavicular joint 2019    left due to fall    • History of fall 2019    dislocated left shoulder and injured knees    • Hyperlipidemia    • Hypertension    • Sleep apnea with use of continuous positive airway pressure (CPAP)    • Wears reading eyeglasses      Past Surgical History:   Procedure Laterality Date   • APPENDECTOMY     • COLONOSCOPY     • HERNIA REPAIR      umbilical and ventral wall hernia    • REPLACEMENT TOTAL KNEE Right           OT ASSESSMENT FLOWSHEET (last 12 hours)      Occupational Therapy Evaluation     Row Name 19 1035                   OT Evaluation Time/Intention    Subjective Information  no complaints  -AR        Document Type  evaluation;therapy note (daily note);discharge treatment  -AR        Mode of Treatment  occupational therapy  -AR        Patient Effort  excellent  -AR        Symptoms Noted During/After Treatment  none  -AR           General Information    Patient Profile Reviewed?  yes  -AR        Onset of Illness/Injury or Date of Surgery  19  -AR        Referring Physician  Dr. Carey  -AR        Patient Observations  alert;cooperative;agree to  therapy  -AR        Patient/Family Observations  pt supine, family at bedside  -AR        Prior Level of Function  independent:;all household mobility;community mobility;gait;transfer;ADL's;driving;work  -AR        Equipment Currently Used at Home  bipap/ cpap  -AR        Pertinent History of Current Functional Problem  Pt is a 59 yom admitted for surgical management of left sternoclavicular dislocation. Pt is POD#1 open reduction left sternoclavicular dislocation.   -AR        Existing Precautions/Restrictions  shoulder;non-weight bearing;left  -AR        Risks Reviewed  patient and family:;LOB;nausea/vomiting;dizziness;increased discomfort;change in vital signs;increased drainage;lines disloged  -AR        Benefits Reviewed  patient and family:;improve function;increase independence;increase strength;increase balance;decrease pain;decrease risk of DVT;increase knowledge  -AR        Barriers to Rehab  none identified  -AR           Relationship/Environment    Primary Source of Support/Comfort  significant other  -AR        Lives With  alone  -AR        Concerns About Impact on Relationships  SO will be assisting pt at home, was present for teaching  -AR           Resource/Environmental Concerns    Current Living Arrangements  home/apartment/condo  -AR        Resource/Environmental Concerns  home accessibility  -AR        Home Accessibility Concerns  stairs to enter home  -AR        Transportation Concerns  car, none  -AR           Home Main Entrance    Number of Stairs, Main Entrance  three  -AR        Stair Railings, Main Entrance  none  -AR           Cognitive Assessment/Interventions    Additional Documentation  Cognitive Assessment/Intervention (Group)  -AR           Cognitive Assessment/Intervention- PT/OT    Affect/Mental Status (Cognitive)  WNL  -AR        Orientation Status (Cognition)  oriented x 4  -AR        Follows Commands (Cognition)  WNL  -AR        Cognitive Function (Cognitive)  WNL  -AR         Personal Safety Interventions  gait belt;supervised activity  -AR           Mobility Assessment/Treatment    Extremity Weight-bearing Status  left upper extremity  -AR        Left Upper Extremity (Weight-bearing Status)  non weight-bearing (NWB)  -AR           Bed Mobility Assessment/Treatment    Bed Mobility Assessment/Treatment  supine-sit;scooting/bridging  -AR        Scooting/Bridging [place] (Bed Mobility)  independent  -AR        Supine-Sit [place] (Bed Mobility)  conditional independence  -AR        Bed Mobility, Safety Issues  decreased use of arms for pushing/pulling  -AR        Assistive Device (Bed Mobility)  head of bed elevated  -AR        Comment (Bed Mobility)  Educated pt on importance of maintaining NWB LUE AAT, including bed mobililty and reviewed safe sleeping positions  -AR           Functional Mobility    Functional Mobility- Ind. Level  independent  -AR        Functional Mobility-Distance (Feet)  300  -AR        Functional Mobility- Comment  No issued with LOB or dyspnea. Pt passed mobility screen with score of 22, please DC PT.   -AR           Transfer Assessment/Treatment    Transfer Assessment/Treatment  sit-stand transfer;stand-sit transfer  -AR           Sit-Stand Transfer    Sit-Stand [place] (Transfers)  independent  -AR           Stand-Sit Transfer    Stand-Sit [place] (Transfers)  independent  -AR           ADL Assessment/Intervention    BADL Assessment/Intervention  bathing;upper body dressing  -AR           Bathing Assessment/Intervention    Comment (Bathing)  Educated pt and SO on L shoulder precautions and axilla care to maintain  -AR           Upper Body Dressing Assessment/Training    Upper Body Dressing [place] Level  don;pajama/robe;doff;other (see comments);moderate assist (50% patient effort) LUE sling  -AR        Assistive Devices (Upper Body Dressing)  one hand technique  -AR        Upper Body Dressing Position  edge of bed sitting  -AR         Comment (Upper Body Dressing)  OT educated pt and SO on left shoulder precautions, ADL retraining to maintain, sling application/wear schedule and proper fit. Pt and SO able to don/doff LUE sling with supervision.     -AR           BADL Safety/Performance    Impairments, BADL Safety/Performance  other (see comments) L shoulder precautions  -AR        Skilled BADL Treatment/Intervention  compensatory training;jamari/one-hand technique  -AR        Progress in BADL Status  improvement noted  -AR           General ROM    GENERAL ROM COMMENTS  RUE WFL, L elbow/wrist/hand WFL  -AR           MMT (Manual Muscle Testing)    General MMT Comments  RUE WFL, LUE deferred  -AR           Motor Assessment/Interventions    Additional Documentation  Balance (Group);Balance Interventions (Group);Fine Motor Testing & Training (Group);Therapeutic Exercise (Group);Therapeutic Exercise Interventions (Group)  -AR           Therapeutic Exercise    41679 - OT Therapeutic Exercise Minutes  8  -AR        10080 - OT Therapeutic Activity Minutes  --  -AR           Therapeutic Exercise    Upper Extremity Range of Motion (Therapeutic Exercise)  elbow flexion/extension, left;forearm supination/pronation, left;wrist flexion/extension, left  -AR        Hand (Therapeutic Exercise)  finger flexion/extension, left  -AR        Exercise Type (Therapeutic Exercise)  AROM (active range of motion)  -AR        Position (Therapeutic Exercise)  supine  -AR        Sets/Reps (Therapeutic Exercise)  1/10  -AR        Comment (Therapeutic Exercise)  OT issued and reviewed ZACH BARILLAS, pt completed with supervision.   -AR           Balance    Balance  static sitting balance;static standing balance  -AR           Static Sitting Balance    Level of [place] (Unsupported Sitting, Static Balance)  independent  -AR        Sitting Position (Unsupported Sitting, Static Balance)  sitting on edge of bed  -AR        Time Able to Maintain Position (Unsupported Sitting, Static  Balance)  more than 5 minutes  -AR           Static Standing Balance    Level of [place] (Supported Standing, Static Balance)  independent  -AR        Time Able to Maintain Position (Supported Standing, Static Balance)  1 to 2 minutes  -AR           Fine Motor Testing & Training    Fine Motor Tests  other (see comments) L opposition intact  -AR           Sensory Assessment/Intervention    Sensory General Assessment  no sensation deficits identified  -AR           Positioning and Restraints    Pre-Treatment Position  in bed  -AR        Post Treatment Position  bed  -AR           Pain Assessment    Additional Documentation  Pain Scale: Numbers Pre/Post-Treatment (Group)  -AR           Pain Scale: Numbers Pre/Post-Treatment    Pain Scale: Numbers, Pretreatment  1/10  -AR        Pain Scale: Numbers, Post-Treatment  1/10  -AR        Pain Location - Orientation  incisional  -AR           Orthotics & Prosthetics Management    Orthosis Location  upper extremity orthosis  -AR        Additional Documentation  Orthosis Location (Row)  -AR           Upper Extremity Orthosis Management    Type (Upper Extremity Orthosis)  ZACH Jewell Ultra II sling  -AR        Functional Design (Upper Extremity Orthosis)  static orthosis  -AR        Therapeutic Indications (Upper Extremity Orthosis)  post-op positioning/protection;stabilization and support  -AR        Wearing Schedule (Upper Extremity Orthosis)  remove for exercise;remove for hygiene/bathing  -AR        Orthosis Training (Upper Extremity Orthosis)  patient and caregiver;all orthosis skills;activity limitations/precautions;cleaning/care of orthosis;donning/doffing orthosis;orthosis adjustment;orthosis maintenance;purpose/goals of orthosis;sensory precautions;skin inspection/precautions;sleeping precautions;wearing schedule;able to verbalize training;able to show back training;able to teach back training  -AR        Compliance/Wearing Issues (Upper Extremity Orthosis)   patient/caregiver comprehend strategies  -AR           Wound 07/03/19 1446 Bilateral neck incision    Wound - Properties Group Date first assessed: 07/03/19  -DB Time first assessed: 1446  -DB Side: Bilateral  -DB Location: neck  -DB Type: incision  -DB Additional Comments: one dressing left anterior clavical area, Aquacel AG dressing and abduction sling  -JR       Plan of Care Review    Plan of Care Reviewed With  patient;significant other  -AR           Clinical Impression (OT)    Date of Referral to OT  07/03/19  -AR        OT Diagnosis  decerased independence with ADLS  -AR        Patient/Family Goals Statement (OT Eval)  return home  -AR        Criteria for Skilled Therapeutic Interventions Met (OT Eval)  yes;treatment indicated  -AR        Rehab Potential (OT Eval)  good, to achieve stated therapy goals  -AR        Therapy Frequency (OT Eval)  evaluation only  -AR        Anticipated Discharge Disposition (OT)  home with assist  -AR           OT Goals    Bed Mobility Goal Selection (OT)  bed mobility, OT goal 1  -AR        Transfer Goal Selection (OT)  transfer, OT goal 1  -AR        Dressing Goal Selection (OT)  dressing, OT goal 1  -AR        ROM Goal Selection (OT)  ROM, OT goal 1  -AR        Precaution Goal Selection (OT)  precaution, OT goal 1  -AR        Additional Documentation  ROM Goal Selection (OT) (Row);Precaution Goal Selection (OT) (Row)  -AR           Bed Mobility Goal 1 (OT)    Activity/Assistive Device (Bed Mobility Goal 1, OT)  supine to sit while maintaining NWB LUE  -AR        [place] Level/Cues Needed (Bed Mobility Goal 1, OT)  supervision required;verbal cues required  -AR        Time Frame (Bed Mobility Goal 1, OT)  short term goal (STG);1 day  -AR        Progress/Outcomes (Bed Mobility Goal 1, OT)  goal met  -AR           Transfer Goal 1 (OT)    Activity/Assistive Device (Transfer Goal 1, OT)  sit-to-stand/stand-to-sit  -AR        [place] Level/Cues Needed (Transfer Goal 1, OT)   independent  -AR        Time Frame (Transfer Goal 1, OT)  short term goal (STG);1 day  -AR        Progress/Outcome (Transfer Goal 1, OT)  goal met  -AR           Dressing Goal 1 (OT)    Activity/Assistive Device (Dressing Goal 1, OT)  other (see comments) Pt and SO will don/doff LUE sling  -AR        [place]/Cues Needed (Dressing Goal 1, OT)  supervision required;verbal cues required  -AR        Time Frame (Dressing Goal 1, OT)  short term goal (STG);1 day  -AR        Progress/Outcome (Dressing Goal 1, OT)  goal met  -AR           ROM Goal 1 (OT)    ROM Goal 1 (OT)  Pt will complete LUE HEP within physician parameters with supervision.   -AR        Time Frame (ROM Goal 1, OT)  short term goal (STG);1 day  -AR        Progress/Outcome (ROM Goal 1, OT)  goal met  -AR           Precaution Goal 1 (OT)    Activity (Precaution Goal 1, OT)  other (see comments);during all tasks in daily routine Pt will maintain L shoulder precautions  -AR        [place] Level/Cues Needed (Precautions Goal 1, OT)  independently  -AR        Time Frame (Precaution Goal 1, OT)  short term goal (STG);1 day  -AR        Progress/Outcome (Precaution Goal 1, OT)  goal met  -AR           Discharge Summary (Occupational Therapy)    Additional Documentation  Discharge Summary, OT Eval (Group)  -AR           Discharge Summary, OT Eval    Reason for Discharge (OT Discharge Summary)  patient discharged from this facility  -AR        Outcomes Achieved Upon Discharge (OT Discharge Summary)  able to achieve all goals within established timeline  -AR           Living Environment    Home Accessibility  stairs to enter home  -AR          User Key  (r) = Recorded By, (t) = Taken By, (c) = Cosigned By    Initials Name Effective Dates    AR Dipti Casillas, OT 06/22/15 -     Cecy Zarate RN 06/16/16 -     Libby Torres RN 06/16/16 -           Occupational Therapy Education     Title: PT OT SLP Therapies (Done)     Topic: Occupational  Therapy (Done)     Point: ADL training (Done)     Description: Instruct learner(s) on proper safety adaptation and remediation techniques during self care or transfers.   Instruct in proper use of assistive devices.    Learning Progress Summary           Patient Eager, E,TB,D,H, VU,DU by AR at 7/4/2019 10:35 AM   Significant Other Eager, E,TB,D,H, VU,DU by AR at 7/4/2019 10:35 AM                   Point: Home exercise program (Done)     Description: Instruct learner(s) on appropriate technique for monitoring, assisting and/or progressing therapeutic exercises/activities.    Learning Progress Summary           Patient Eager, E,TB,D,H, VU,DU by AR at 7/4/2019 10:35 AM   Significant Other Eager, E,TB,D,H, VU,DU by AR at 7/4/2019 10:35 AM                   Point: Precautions (Done)     Description: Instruct learner(s) on prescribed precautions during self-care and functional transfers.    Learning Progress Summary           Patient Eager, E,TB,D,H, VU,DU by AR at 7/4/2019 10:35 AM   Significant Other Eager, E,TB,D,H, VU,DU by AR at 7/4/2019 10:35 AM                   Point: Body mechanics (Done)     Description: Instruct learner(s) on proper positioning and spine alignment during self-care, functional mobility activities and/or exercises.    Learning Progress Summary           Patient Eager, E,TB,D,H, VU,DU by AR at 7/4/2019 10:35 AM   Significant Other Eager, E,TB,D,H, VU,DU by AR at 7/4/2019 10:35 AM                               User Key     Initials Effective Dates Name Provider Type Discipline    AR 06/22/15 -  Dipti Casillas OT Occupational Therapist OT                OT Recommendation and Plan  Outcome Summary/Treatment Plan (OT)  Anticipated Discharge Disposition (OT): home with assist  Reason for Discharge (OT Discharge Summary): patient discharged from this facility  Therapy Frequency (OT Eval): evaluation only  Plan of Care Review  Plan of Care Reviewed With: patient, significant other  Plan of Care  Reviewed With: patient, significant other  Outcome Summary: Post pain rating 1/10 incision. OT educated pt on L shoulder precautions, ADL retraining to maintain, NWB LUE, sling management and HEP. Pt passed mobility screen, please DC PT. Recommend DC home with family assist,issued rehab department phone number in case questions arise.      Rehab Goal Summary     Row Name 07/04/19 1035             Occupational Therapy Goals    Bed Mobility Goal Selection (OT)  bed mobility, OT goal 1  -AR      Transfer Goal Selection (OT)  transfer, OT goal 1  -AR      Dressing Goal Selection (OT)  dressing, OT goal 1  -AR      ROM Goal Selection (OT)  ROM, OT goal 1  -AR      Precaution Goal Selection (OT)  precaution, OT goal 1  -AR         Bed Mobility Goal 1 (OT)    Activity/Assistive Device (Bed Mobility Goal 1, OT)  supine to sit while maintaining NWB LUE  -AR      [place] Level/Cues Needed (Bed Mobility Goal 1, OT)  supervision required;verbal cues required  -AR      Time Frame (Bed Mobility Goal 1, OT)  short term goal (STG);1 day  -AR      Progress/Outcomes (Bed Mobility Goal 1, OT)  goal met  -AR         Transfer Goal 1 (OT)    Activity/Assistive Device (Transfer Goal 1, OT)  sit-to-stand/stand-to-sit  -AR      [place] Level/Cues Needed (Transfer Goal 1, OT)  independent  -AR      Time Frame (Transfer Goal 1, OT)  short term goal (STG);1 day  -AR      Progress/Outcome (Transfer Goal 1, OT)  goal met  -AR         Dressing Goal 1 (OT)    Activity/Assistive Device (Dressing Goal 1, OT)  other (see comments) Pt and SO will don/doff LUE sling  -AR      [place]/Cues Needed (Dressing Goal 1, OT)  supervision required;verbal cues required  -AR      Time Frame (Dressing Goal 1, OT)  short term goal (STG);1 day  -AR      Progress/Outcome (Dressing Goal 1, OT)  goal met  -AR         ROM Goal 1 (OT)    ROM Goal 1 (OT)  Pt will complete LUE HEP within physician parameters with supervision.   -AR      Time Frame (ROM Goal  1, OT)  short term goal (STG);1 day  -AR      Progress/Outcome (ROM Goal 1, OT)  goal met  -AR         Precaution Goal 1 (OT)    Activity (Precaution Goal 1, OT)  other (see comments);during all tasks in daily routine Pt will maintain L shoulder precautions  -AR      [place] Level/Cues Needed (Precautions Goal 1, OT)  independently  -AR      Time Frame (Precaution Goal 1, OT)  short term goal (STG);1 day  -AR      Progress/Outcome (Precaution Goal 1, OT)  goal met  -AR        User Key  (r) = Recorded By, (t) = Taken By, (c) = Cosigned By    Initials Name Provider Type Discipline    Dipti Huizar, OT Occupational Therapist OT          Outcome Measures     Row Name 07/04/19 1035             How much help from another is currently needed...    Putting on and taking off regular lower body clothing?  3  -AR      Bathing (including washing, rinsing, and drying)  3  -AR      Toileting (which includes using toilet bed pan or urinal)  4  -AR      Putting on and taking off regular upper body clothing  2  -AR      Taking care of personal grooming (such as brushing teeth)  3  -AR      Eating meals  3  -AR      AM-PAC 6 Clicks Score (OT)  18  -AR         Functional Assessment    Outcome Measure Options  AM-PAC 6 Clicks Daily Activity (OT)  -AR        User Key  (r) = Recorded By, (t) = Taken By, (c) = Cosigned By    Initials Name Provider Type    Dipti Huizar, OT Occupational Therapist          Time Calculation:   Time Calculation- OT     Row Name 07/04/19 1035             Time Calculation- OT    OT Start Time  1035  -AR      Total Timed Code Minutes- OT  21 minute(s)  -AR      OT Received On  07/04/19  -AR      OT Goal Re-Cert Due Date  07/04/19  -AR         Timed Charges    06825 - OT Therapeutic Exercise Minutes  8  -AR      37198 - OT Therapeutic Activity Minutes  --  -AR      16102 - OT Self Care/Mgmt Minutes  13  -AR        User Key  (r) = Recorded By, (t) = Taken By, (c) = Cosigned By    Initials  Name Provider Type    Dipti Huizar OT Occupational Therapist        Therapy Suggested Charges     Code   Minutes Charges    74467 (CPT®) Hc Ot Neuromusc Re Education Ea 15 Min      26014 (CPT®) Hc Ot Ther Proc Ea 15 Min 8     25137 (CPT®) Hc Ot Therapeutic Act Ea 15 Min      55616 (CPT®) Hc Ot Manual Therapy Ea 15 Min      48554 (CPT®) Hc Ot Iontophoresis Ea 15 Min      06924 (CPT®) Hc Ot Elec Stim Ea-Per 15 Min      82050 (CPT®) Hc Ot Ultrasound Ea 15 Min      88062 (CPT®) Hc Ot Self Care/Mgmt/Train Ea 15 Min 13 1    Total  21 1        Therapy Charges for Today     Code Description Service Date Service Provider Modifiers Qty    [id number] HC OT SELF CARE/MGMT/TRAIN EA 15 MIN 7/4/2019 Dipti Casillas OT GO 1    [id number]  OT EVAL LOW COMPLEXITY 4 7/4/2019 Dipti Casillas OT GO 1               OT Discharge Summary  Anticipated Discharge Disposition (OT): home with assist  Reason for Discharge: Discharge from facility  Outcomes Achieved: Able to achieve all goals within established timeline  Discharge Destination: Home with assist    Dipti Casillas OT  7/4/2019

## 2019-07-04 NOTE — PROGRESS NOTES
CTS Progress Note      POD 1 s/p Open reduction left posterior sternoclavicular dislocation with repair of sternoclavicular ligaments      Chief Complaint: left chest/shoulder pain    Subjective  Patient is a 59 y.o. male who presents with left shoulder pain that started immediately after he fell off the roof of his mobile home on 6/21/19. He went to an urgent care center in [place], [place] where he was told he had a clavicle injury. He followed up at [facility] where he was told he had a posterior sternoclavicular dislocation of his left clavicle. He then went to [facility] in [place] who referred him to Dr. Carey. CT surgery was also consulted for the procedure due to the location of the displaced clavicle abutted against the transverse arch of the aorta within the mediastinum.      Objective    Physical Exam:   Vital Signs   Temp:  [97 °F (36.1 °C)-98.7 °F (37.1 °C)] 98.7 °F (37.1 °C)  Heart Rate:  [] 77  Resp:  [16-18] 16  BP: (114-151)/(76-94) 118/84  FiO2 (%):  [40 %] 40 %     CV - RRR, no heaves, rubs, gallops or murmurs.   RESP - Lungs clear to ascultation bilaterally. Respirations unlabored. No wheezes, crackles or rales  EXT - 3+ bilateral radial pulses.Extremities normal in size, symmetry and sensation. Left upper capillary refill < 2 seconds without tenderness, swelling or discoloration.     Results     Results from last 7 days   Lab Units 07/04/19  0534   WBC 10*3/mm3 13.37*   HEMOGLOBIN g/dL 12.5*   HEMATOCRIT % 36.7*   PLATELETS 10*3/mm3 331     Results from last 7 days   Lab Units 07/04/19  0534   SODIUM mmol/L 131*   POTASSIUM mmol/L 4.6   CHLORIDE mmol/L 96*   CO2 mmol/L 24.0   BUN mg/dL 23   CREATININE mg/dL 1.03   GLUCOSE mg/dL 208*   CALCIUM mg/dL 8.8     CXR - Post-op - Single frontal chest radiograph was submitted in this post-op patient from surgical fixation of a posterior dislocated left clavicle. Low lung volumes are noted. Bibasilar atelectasis is identified. The  lungs are otherwise clear. No pneumothorax. No sizable pleural effusion. The heart is prominent in size although favored to relate to the portable technique and incomplete inspiration of the film. Slight asymmetry involving the left clavicular head is noted bilaterally, although improved when compared to the chest radiograph performed 07/02/2019.     Assessment/Plan       Posterior dislocation of left sternoclavicular joint    Hypothyroidism (acquired)    MEGA on CPAP    Essential hypertension    HLD (hyperlipidemia)        Plan   Will see patient PRN I agree with the above.  No vascular surgery intervention has been required.  We will see as needed    Benjamin Hardin PA-C  07/04/19  7:07 AM

## 2019-07-04 NOTE — PROGRESS NOTES
Orthopedic Daily Progress Note      CC: Feels much better    Pain controlled  General: no fevers, chills  Abdomen: no nausea, vomiting, or diarrhea    No other complaints    Physical Exam:  I have reviewed the vital signs.  Temp:  [97 °F (36.1 °C)-98.7 °F (37.1 °C)] 98.6 °F (37 °C)  Heart Rate:  [] 69  Resp:  [16-18] 16  BP: (114-151)/(76-94) 130/87  FiO2 (%):  [40 %] 40 %    Objective  General Appearance:    Alert, cooperative, no distress  Extremities: No clubbing, cyanosis, or edema to lower extremities  Pulses:  2+ in distal surgical extremity  Skin: Dressing Clean/dry/intact      Results Review:    I have reviewed the labs, radiology results and diagnostic studies:yes    CXR: reduced left SC joint    Results from last 7 days   Lab Units 07/04/19  0534   WBC 10*3/mm3 13.37*   HEMOGLOBIN g/dL 12.5*   PLATELETS 10*3/mm3 331     Results from last 7 days   Lab Units 07/04/19  0534   SODIUM mmol/L 131*   POTASSIUM mmol/L 4.6   CO2 mmol/L 24.0   CREATININE mg/dL 1.03   GLUCOSE mg/dL 208*       I have reviewed the medications.    Assessment/Problem List  POD# 1 S/p Open reduction left sternoclavicular dislocation    Plan  D/c to home  Sling at all times.    Maynor Carey MD  07/04/19  10:04 AM

## 2019-07-04 NOTE — PLAN OF CARE
Problem: Patient Care Overview  Goal: Plan of Care Review   07/04/19 1035   Coping/Psychosocial   Plan of Care Reviewed With patient;significant other   Plan of Care Review   Progress improving   OTHER   Outcome Summary Post pain rating 1/10 incision. OT educated pt on L shoulder precautions, ADL retraining to maintain, NWB LUE, sling management and HEP. Pt passed mobility screen, please DC PT. Recommend DC home with family assist,issued rehab department phone number in case questions arise.        Problem: Surgery Nonspecified (Adult)  Goal: Signs and Symptoms of Listed Potential Problems Will be Absent, Minimized or Managed (Surgery Nonspecified)  Outcome: Outcome(s) achieved Date Met: 07/04/19

## 2019-07-04 NOTE — PROGRESS NOTES
Discharge Planning Assessment  [facility]     Patient Name: Shamir Conner  MRN: [id number]  Today's Date: 7/4/2019    Admit Date: 7/3/2019    Discharge Needs Assessment     Row Name 07/04/19 1135       Living Environment    Lives With  alone    Current Living Arrangements  home/apartment/condo    Primary Care Provided by  self    Provides Primary Care For  no one    Family Caregiver if Needed  friend(s)    Family Caregiver Names  Evangelina Soto    Quality of Family Relationships  helpful;involved    Able to Return to Prior Arrangements  yes       Resource/Environmental Concerns    Resource/Environmental Concerns  none    Transportation Concerns  car, none       Transition Planning    Patient/Family Anticipates Transition to  home    Patient/Family Anticipated Services at Transition  none    Transportation Anticipated  family or friend will provide       Discharge Needs Assessment    Readmission Within the Last 30 Days  no previous admission in last 30 days    Concerns to be Addressed  no discharge needs identified    Equipment Currently Used at Home  bipap/cpap    Anticipated Changes Related to Illness  inability to care for self    Equipment Needed After Discharge  none    Current Discharge Risk  dependent with mobility/activities of daily living;lives alone        Discharge Plan     Row Name 07/04/19 1134       Plan    Plan  Home    Patient/Family in Agreement with Plan  yes    Plan Comments  I met with Mr Conner and friend at bedside to discuss d/c plan. His plan is to return home. He lives alone. Prior to his injury he was independent with all ADL's, works as a . He has a friend at bedside who will assist as needed at home with meal prep,transportation,etc. He uses no assistive equipment. He has a Bipap at home.No d/c needs identified at this time.    Final Discharge Disposition Code  01 - home or self-care        Destination      No service coordination in this encounter.      Durable Medical  Equipment      No service coordination in this encounter.      Dialysis/Infusion      No service coordination in this encounter.      Home Medical Care      No service coordination in this encounter.      Therapy      No service coordination in this encounter.      Community Resources      No service coordination in this encounter.        Expected Discharge Date and Time     Expected Discharge Date Expected Discharge Time    Jul 4, 2019         Demographic Summary     Row Name 07/04/19 1133       General Information    Admission Type  observation    Arrived From  home    Referral Source  admission list    Reason for Consult  discharge planning    Preferred Language  English    General Information Comments  PCP- Esther Wilkes       Contact Information    Permission Granted to Share Info With  ;family/designee    Contact Information Comments  Evangelina Soto (friend) [phone], cell [phone]        Functional Status     Row Name 07/04/19 1134       Functional Status    Usual Activity Tolerance  good       Functional Status, IADL    Medications  independent    Meal Preparation  independent    Housekeeping  independent    Laundry  independent    Shopping  independent    IADL Comments  independent with ADL's prior to injury       Mental Status    General Appearance WDL  WDL       Mental Status Summary    Recent Changes in Mental Status/Cognitive Functioning  no changes       Employment/    Employment Status  employed part time    Shift Worked  first shift    Current or Previous Occupation  traveling/driving drives a school bus    Employment/ Comments  insurance- [employer]        Psychosocial    No documentation.       Abuse/Neglect    No documentation.       Legal    No documentation.       Substance Abuse    No documentation.       Patient Forms    No documentation.           Sonja C Kellerman, RN

## 2019-07-07 LAB
ABO + RH BLD: NORMAL
ABO + RH BLD: NORMAL
BH BB BLOOD EXPIRATION DATE: NORMAL
BH BB BLOOD EXPIRATION DATE: NORMAL
BH BB BLOOD TYPE BARCODE: 6200
BH BB BLOOD TYPE BARCODE: 6200
BH BB DISPENSE STATUS: NORMAL
BH BB DISPENSE STATUS: NORMAL
BH BB PRODUCT CODE: NORMAL
BH BB PRODUCT CODE: NORMAL
BH BB UNIT NUMBER: NORMAL
BH BB UNIT NUMBER: NORMAL
CROSSMATCH INTERPRETATION: NORMAL
CROSSMATCH INTERPRETATION: NORMAL
UNIT  ABO: NORMAL
UNIT  ABO: NORMAL
UNIT  RH: NORMAL
UNIT  RH: NORMAL

## 2020-03-27 ENCOUNTER — APPOINTMENT (OUTPATIENT)
Dept: CT IMAGING | Facility: HOSPITAL | Age: 60
End: 2020-03-27

## 2020-03-27 ENCOUNTER — APPOINTMENT (OUTPATIENT)
Dept: GENERAL RADIOLOGY | Facility: HOSPITAL | Age: 60
End: 2020-03-27

## 2020-03-27 ENCOUNTER — HOSPITAL ENCOUNTER (EMERGENCY)
Facility: HOSPITAL | Age: 60
Discharge: HOME | End: 2020-03-27
Attending: FAMILY MEDICINE | Admitting: FAMILY MEDICINE

## 2020-03-27 VITALS
TEMPERATURE: 98.8 F | HEART RATE: 86 BPM | RESPIRATION RATE: 16 BRPM | DIASTOLIC BLOOD PRESSURE: 61 MMHG | BODY MASS INDEX: 39.17 KG/M2 | OXYGEN SATURATION: 96 % | HEIGHT: 75 IN | WEIGHT: 315 LBS | SYSTOLIC BLOOD PRESSURE: 143 MMHG

## 2020-03-27 DIAGNOSIS — S73.102A HIP SPRAIN, LEFT, INITIAL ENCOUNTER: ICD-10-CM

## 2020-03-27 DIAGNOSIS — S09.90XA CLOSED HEAD INJURY, INITIAL ENCOUNTER: ICD-10-CM

## 2020-03-27 DIAGNOSIS — S80.211A ABRASION, RIGHT KNEE, INITIAL ENCOUNTER: Primary | ICD-10-CM

## 2020-03-27 DIAGNOSIS — S46.912A STRAIN OF LEFT SHOULDER, INITIAL ENCOUNTER: ICD-10-CM

## 2020-03-27 PROCEDURE — 90715 TDAP VACCINE 7 YRS/> IM: CPT | Performed by: FAMILY MEDICINE

## 2020-03-27 PROCEDURE — 99283 EMERGENCY DEPT VISIT LOW MDM: CPT

## 2020-03-27 PROCEDURE — 71250 CT THORAX DX C-: CPT

## 2020-03-27 PROCEDURE — 73030 X-RAY EXAM OF SHOULDER: CPT

## 2020-03-27 PROCEDURE — 73030 X-RAY EXAM OF SHOULDER: CPT | Performed by: RADIOLOGY

## 2020-03-27 PROCEDURE — 70450 CT HEAD/BRAIN W/O DYE: CPT

## 2020-03-27 PROCEDURE — 71250 CT THORAX DX C-: CPT | Performed by: RADIOLOGY

## 2020-03-27 PROCEDURE — 70450 CT HEAD/BRAIN W/O DYE: CPT | Performed by: RADIOLOGY

## 2020-03-27 PROCEDURE — 90471 IMMUNIZATION ADMIN: CPT | Performed by: FAMILY MEDICINE

## 2020-03-27 PROCEDURE — 25010000002 TDAP 5-2.5-18.5 LF-MCG/0.5 SUSPENSION: Performed by: FAMILY MEDICINE

## 2020-03-27 PROCEDURE — 73562 X-RAY EXAM OF KNEE 3: CPT

## 2020-03-27 PROCEDURE — 73502 X-RAY EXAM HIP UNI 2-3 VIEWS: CPT

## 2020-03-27 PROCEDURE — 73502 X-RAY EXAM HIP UNI 2-3 VIEWS: CPT | Performed by: RADIOLOGY

## 2020-03-27 PROCEDURE — 73562 X-RAY EXAM OF KNEE 3: CPT | Performed by: RADIOLOGY

## 2020-03-27 RX ORDER — DICLOFENAC SODIUM 75 MG/1
75 TABLET, DELAYED RELEASE ORAL 2 TIMES DAILY PRN
Qty: 20 TABLET | Refills: 0 | Status: SHIPPED | OUTPATIENT
Start: 2020-03-27

## 2020-03-27 RX ORDER — HYDROCODONE BITARTRATE AND ACETAMINOPHEN 7.5; 325 MG/1; MG/1
1 TABLET ORAL ONCE
Status: COMPLETED | OUTPATIENT
Start: 2020-03-27 | End: 2020-03-27

## 2020-03-27 RX ADMIN — TETANUS TOXOID, REDUCED DIPHTHERIA TOXOID AND ACELLULAR PERTUSSIS VACCINE, ADSORBED 0.5 ML: 5; 2.5; 8; 8; 2.5 SUSPENSION INTRAMUSCULAR at 21:02

## 2020-03-27 RX ADMIN — HYDROCODONE BITARTRATE AND ACETAMINOPHEN 1 TABLET: 7.5; 325 TABLET ORAL at 21:02

## 2021-02-23 ENCOUNTER — TRANSCRIBE ORDERS (OUTPATIENT)
Dept: ADMINISTRATIVE | Facility: HOSPITAL | Age: 61
End: 2021-02-23

## 2021-02-23 DIAGNOSIS — I20.0 UNSTABLE ANGINA (HCC): Primary | ICD-10-CM

## 2021-03-04 ENCOUNTER — HOSPITAL ENCOUNTER (OUTPATIENT)
Facility: HOSPITAL | Age: 61
Discharge: HOME | End: 2021-03-04
Attending: INTERNAL MEDICINE | Admitting: INTERNAL MEDICINE

## 2021-03-04 VITALS
BODY MASS INDEX: 39.17 KG/M2 | HEART RATE: 77 BPM | DIASTOLIC BLOOD PRESSURE: 85 MMHG | RESPIRATION RATE: 16 BRPM | HEIGHT: 75 IN | WEIGHT: 315 LBS | SYSTOLIC BLOOD PRESSURE: 169 MMHG | TEMPERATURE: 98.8 F | OXYGEN SATURATION: 96 %

## 2021-03-04 DIAGNOSIS — I20.0 UNSTABLE ANGINA (HCC): ICD-10-CM

## 2021-03-04 LAB
ANION GAP SERPL CALCULATED.3IONS-SCNC: 7 MMOL/L (ref 5–15)
BUN SERPL-MCNC: 15 MG/DL (ref 8–23)
BUN/CREAT SERPL: 15.6 (ref 7–25)
CALCIUM SPEC-SCNC: 9.1 MG/DL (ref 8.6–10.5)
CHLORIDE SERPL-SCNC: 98 MMOL/L (ref 98–107)
CO2 SERPL-SCNC: 27 MMOL/L (ref 22–29)
CREAT BLDA-MCNC: 1.1 MG/DL (ref 0.6–1.3)
CREAT SERPL-MCNC: 0.96 MG/DL (ref 0.76–1.27)
DEPRECATED RDW RBC AUTO: 38.9 FL (ref 37–54)
ERYTHROCYTE [DISTWIDTH] IN BLOOD BY AUTOMATED COUNT: 13.2 % (ref 12.3–15.4)
GFR SERPL CREATININE-BSD FRML MDRD: 79 ML/MIN/1.73
GLUCOSE BLDC GLUCOMTR-MCNC: 233 MG/DL (ref 70–130)
GLUCOSE SERPL-MCNC: 246 MG/DL (ref 65–99)
HCT VFR BLD AUTO: 40 % (ref 37.5–51)
HGB BLD-MCNC: 13.9 G/DL (ref 13–17.7)
MCH RBC QN AUTO: 28.4 PG (ref 26.6–33)
MCHC RBC AUTO-ENTMCNC: 34.8 G/DL (ref 31.5–35.7)
MCV RBC AUTO: 81.8 FL (ref 79–97)
PLATELET # BLD AUTO: 245 10*3/MM3 (ref 140–450)
PMV BLD AUTO: 10.4 FL (ref 6–12)
POTASSIUM SERPL-SCNC: 4.3 MMOL/L (ref 3.5–5.2)
RBC # BLD AUTO: 4.89 10*6/MM3 (ref 4.14–5.8)
SARS-COV-2 RDRP RESP QL NAA+PROBE: NORMAL
SODIUM SERPL-SCNC: 132 MMOL/L (ref 136–145)
WBC # BLD AUTO: 6.46 10*3/MM3 (ref 3.4–10.8)

## 2021-03-04 PROCEDURE — 25010000002 MIDAZOLAM PER 1 MG: Performed by: INTERNAL MEDICINE

## 2021-03-04 PROCEDURE — 25010000002 FENTANYL CITRATE (PF) 100 MCG/2ML SOLUTION: Performed by: INTERNAL MEDICINE

## 2021-03-04 PROCEDURE — 0 IOPAMIDOL PER 1 ML: Performed by: INTERNAL MEDICINE

## 2021-03-04 PROCEDURE — 85027 COMPLETE CBC AUTOMATED: CPT

## 2021-03-04 PROCEDURE — 99153 MOD SED SAME PHYS/QHP EA: CPT | Performed by: INTERNAL MEDICINE

## 2021-03-04 PROCEDURE — 25010000002 HEPARIN (PORCINE) PER 1000 UNITS: Performed by: INTERNAL MEDICINE

## 2021-03-04 PROCEDURE — 99152 MOD SED SAME PHYS/QHP 5/>YRS: CPT | Performed by: INTERNAL MEDICINE

## 2021-03-04 PROCEDURE — 82565 ASSAY OF CREATININE: CPT

## 2021-03-04 PROCEDURE — C9803 HOPD COVID-19 SPEC COLLECT: HCPCS

## 2021-03-04 PROCEDURE — C1769 GUIDE WIRE: HCPCS | Performed by: INTERNAL MEDICINE

## 2021-03-04 PROCEDURE — 80048 BASIC METABOLIC PNL TOTAL CA: CPT

## 2021-03-04 PROCEDURE — 93454 CORONARY ARTERY ANGIO S&I: CPT | Performed by: INTERNAL MEDICINE

## 2021-03-04 PROCEDURE — 82962 GLUCOSE BLOOD TEST: CPT

## 2021-03-04 PROCEDURE — 87635 SARS-COV-2 COVID-19 AMP PRB: CPT | Performed by: INTERNAL MEDICINE

## 2021-03-04 PROCEDURE — C1894 INTRO/SHEATH, NON-LASER: HCPCS | Performed by: INTERNAL MEDICINE

## 2021-03-04 RX ORDER — BACLOFEN 10 MG/1
10 TABLET ORAL DAILY
COMMUNITY

## 2021-03-04 RX ORDER — ALPRAZOLAM 0.25 MG/1
0.25 TABLET ORAL 3 TIMES DAILY PRN
Status: DISCONTINUED | OUTPATIENT
Start: 2021-03-04 | End: 2021-03-04 | Stop reason: HOSPADM

## 2021-03-04 RX ORDER — LIDOCAINE HYDROCHLORIDE 10 MG/ML
INJECTION, SOLUTION EPIDURAL; INFILTRATION; INTRACAUDAL; PERINEURAL AS NEEDED
Status: DISCONTINUED | OUTPATIENT
Start: 2021-03-04 | End: 2021-03-04 | Stop reason: HOSPADM

## 2021-03-04 RX ORDER — NICOTINE POLACRILEX 4 MG
15 LOZENGE BUCCAL
Status: DISCONTINUED | OUTPATIENT
Start: 2021-03-04 | End: 2021-03-04 | Stop reason: HOSPADM

## 2021-03-04 RX ORDER — VERAPAMIL HYDROCHLORIDE 40 MG/1
40 TABLET ORAL DAILY
COMMUNITY

## 2021-03-04 RX ORDER — ACETAMINOPHEN 325 MG/1
650 TABLET ORAL EVERY 4 HOURS PRN
Status: DISCONTINUED | OUTPATIENT
Start: 2021-03-04 | End: 2021-03-04 | Stop reason: HOSPADM

## 2021-03-04 RX ORDER — GABAPENTIN 600 MG/1
600 TABLET ORAL 2 TIMES DAILY
COMMUNITY

## 2021-03-04 RX ORDER — ASPIRIN 325 MG
325 TABLET, DELAYED RELEASE (ENTERIC COATED) ORAL DAILY
Status: DISCONTINUED | OUTPATIENT
Start: 2021-03-04 | End: 2021-03-04 | Stop reason: HOSPADM

## 2021-03-04 RX ORDER — FENTANYL CITRATE 50 UG/ML
INJECTION, SOLUTION INTRAMUSCULAR; INTRAVENOUS AS NEEDED
Status: DISCONTINUED | OUTPATIENT
Start: 2021-03-04 | End: 2021-03-04 | Stop reason: HOSPADM

## 2021-03-04 RX ORDER — HYDROCODONE BITARTRATE AND ACETAMINOPHEN 5; 325 MG/1; MG/1
1 TABLET ORAL EVERY 4 HOURS PRN
Status: DISCONTINUED | OUTPATIENT
Start: 2021-03-04 | End: 2021-03-04 | Stop reason: HOSPADM

## 2021-03-04 RX ORDER — SIMVASTATIN 10 MG
10 TABLET ORAL NIGHTLY
COMMUNITY

## 2021-03-04 RX ORDER — MIDAZOLAM HYDROCHLORIDE 1 MG/ML
INJECTION INTRAMUSCULAR; INTRAVENOUS AS NEEDED
Status: DISCONTINUED | OUTPATIENT
Start: 2021-03-04 | End: 2021-03-04 | Stop reason: HOSPADM

## 2021-03-04 RX ORDER — SODIUM CHLORIDE 9 MG/ML
250 INJECTION, SOLUTION INTRAVENOUS CONTINUOUS
Status: ACTIVE | OUTPATIENT
Start: 2021-03-04 | End: 2021-03-04

## 2021-03-04 RX ORDER — DEXTROSE MONOHYDRATE 25 G/50ML
25 INJECTION, SOLUTION INTRAVENOUS
Status: DISCONTINUED | OUTPATIENT
Start: 2021-03-04 | End: 2021-03-04 | Stop reason: HOSPADM

## 2021-03-04 RX ORDER — TEMAZEPAM 7.5 MG/1
7.5 CAPSULE ORAL NIGHTLY PRN
Status: DISCONTINUED | OUTPATIENT
Start: 2021-03-04 | End: 2021-03-04 | Stop reason: HOSPADM

## 2021-03-04 RX ADMIN — ASPIRIN 325 MG: 325 TABLET, COATED ORAL at 07:23

## 2021-03-05 ENCOUNTER — DOCUMENTATION (OUTPATIENT)
Dept: CARDIAC REHAB | Facility: HOSPITAL | Age: 61
End: 2021-03-05

## 2021-06-10 ENCOUNTER — HOSPITAL ENCOUNTER (OUTPATIENT)
Dept: HOSPITAL 79 - KOH-I | Age: 61
End: 2021-06-10
Attending: NURSE PRACTITIONER
Payer: COMMERCIAL

## 2021-06-10 DIAGNOSIS — R05: ICD-10-CM

## 2021-06-10 DIAGNOSIS — M54.9: ICD-10-CM

## 2021-06-10 DIAGNOSIS — M25.511: ICD-10-CM

## 2021-06-10 DIAGNOSIS — M54.2: Primary | ICD-10-CM

## 2021-06-10 DIAGNOSIS — J98.09: ICD-10-CM

## 2021-06-10 DIAGNOSIS — M25.512: ICD-10-CM

## 2021-06-10 DIAGNOSIS — M47.816: ICD-10-CM

## 2021-06-10 DIAGNOSIS — M47.812: ICD-10-CM

## (undated) DEVICE — ANTIBACTERIAL UNDYED BRAIDED (POLYGLACTIN 910), SYNTHETIC ABSORBABLE SUTURE: Brand: COATED VICRYL

## (undated) DEVICE — SUT MONOCRYL PLS ANTIB UND 3/0  PS1 27IN

## (undated) DEVICE — PAD ARMBRD SURG CONVOL 7.5X20X2IN

## (undated) DEVICE — INTENDED USE FOR SURGICAL MARKING ON INTACT SKIN, ALSO PROVIDES A PERMANENT METHOD OF IDENTIFYING OBJECTS IN THE OPERATING ROOM: Brand: WRITESITE® REGULAR TIP SKIN MARKER

## (undated) DEVICE — SKIN PREP TRAY W/CHG: Brand: MEDLINE INDUSTRIES, INC.

## (undated) DEVICE — DRSNG SURG AQUACEL AG 9X15CM

## (undated) DEVICE — SUCTION CANISTER, 2500CC, RIGID: Brand: DEROYAL

## (undated) DEVICE — GOWN,REINF,POLY,ECL,PP SLV,XL: Brand: MEDLINE

## (undated) DEVICE — INTRO SHEATH PRELUDE IDEAL SPRNG COIL 021 6F 23X80CM

## (undated) DEVICE — DEV COMP RAD PRELUDESYNC 24CM

## (undated) DEVICE — INTENT TO BE USED WITH SUTURE MATERIAL FOR TISSUE CLOSURE: Brand: RICHARD-ALLAN® NEEDLE 1/2 CIRCLE TAPER

## (undated) DEVICE — SNAP KOVER: Brand: UNBRANDED

## (undated) DEVICE — CATH DIAG EXPO M/ PK 5F FL4/FR4 PIG

## (undated) DEVICE — PK MAJ SHLDR SPLT 10

## (undated) DEVICE — GLV SURG TRIUMPH CLASSIC PF LTX 7.5 STRL

## (undated) DEVICE — SKIN AFFIX SURG ADHESIVE 72/CS 0.55ML: Brand: MEDLINE

## (undated) DEVICE — SUT SILK 2/0 SH 30IN K833H

## (undated) DEVICE — KT MANIFOLD CATHLAB CUST

## (undated) DEVICE — GW INQWIRE FC PTFE STD J/1.5 .035 260

## (undated) DEVICE — CATH DIAG EXPO .045 FL3  5F 100CM

## (undated) DEVICE — SLNG ULTRSLING3 13TO15IN LG

## (undated) DEVICE — INTRAOPERATIVE COVER KIT, 10 PACK: Brand: SITE-RITE

## (undated) DEVICE — BNDG ELAS CO-FLEX SLF ADHR 4IN5YD LF STRL

## (undated) DEVICE — GLV SURG SIGNATURE TOUCH PF LTX 8 STRL BX/50

## (undated) DEVICE — PK CATH CARD 10

## (undated) DEVICE — 3M™ STERI-STRIP™ REINFORCED ADHESIVE SKIN CLOSURES, R1547, 1/2 IN X 4 IN (12 MM X 100 MM), 6 STRIPS/ENVELOPE: Brand: 3M™ STERI-STRIP™